# Patient Record
Sex: MALE | Race: BLACK OR AFRICAN AMERICAN | Employment: FULL TIME | URBAN - METROPOLITAN AREA
[De-identification: names, ages, dates, MRNs, and addresses within clinical notes are randomized per-mention and may not be internally consistent; named-entity substitution may affect disease eponyms.]

---

## 2021-09-01 ENCOUNTER — APPOINTMENT (OUTPATIENT)
Dept: GENERAL RADIOLOGY | Age: 41
DRG: 751 | End: 2021-09-01
Payer: COMMERCIAL

## 2021-09-01 ENCOUNTER — APPOINTMENT (OUTPATIENT)
Dept: CT IMAGING | Age: 41
DRG: 751 | End: 2021-09-01
Payer: COMMERCIAL

## 2021-09-01 ENCOUNTER — HOSPITAL ENCOUNTER (EMERGENCY)
Age: 41
Discharge: HOME | DRG: 751 | End: 2021-09-01
Attending: EMERGENCY MEDICINE
Payer: COMMERCIAL

## 2021-09-01 VITALS
WEIGHT: 200 LBS | HEART RATE: 97 BPM | TEMPERATURE: 96.8 F | HEIGHT: 67 IN | BODY MASS INDEX: 31.39 KG/M2 | OXYGEN SATURATION: 98 % | SYSTOLIC BLOOD PRESSURE: 148 MMHG | DIASTOLIC BLOOD PRESSURE: 107 MMHG | RESPIRATION RATE: 20 BRPM

## 2021-09-01 DIAGNOSIS — R06.02 SHORTNESS OF BREATH: Primary | ICD-10-CM

## 2021-09-01 DIAGNOSIS — F15.10 METHAMPHETAMINE USE (HCC): ICD-10-CM

## 2021-09-01 LAB
ACETAMINOPHEN LEVEL: <5 MCG/ML (ref 10–30)
ALBUMIN SERPL-MCNC: 4.8 G/DL (ref 3.5–5.2)
ALP BLD-CCNC: 66 U/L (ref 40–129)
ALT SERPL-CCNC: 75 U/L (ref 0–40)
AMPHETAMINE SCREEN, URINE: POSITIVE
ANION GAP SERPL CALCULATED.3IONS-SCNC: 17 MMOL/L (ref 7–16)
AST SERPL-CCNC: 211 U/L (ref 0–39)
BACTERIA: NORMAL /HPF
BARBITURATE SCREEN URINE: NOT DETECTED
BASOPHILS ABSOLUTE: 0.02 E9/L (ref 0–0.2)
BASOPHILS RELATIVE PERCENT: 0.1 % (ref 0–2)
BENZODIAZEPINE SCREEN, URINE: NOT DETECTED
BILIRUB SERPL-MCNC: 1.1 MG/DL (ref 0–1.2)
BILIRUBIN URINE: ABNORMAL
BILIRUBIN URINE: ABNORMAL
BLOOD, URINE: ABNORMAL
BLOOD, URINE: ABNORMAL
BUN BLDV-MCNC: 18 MG/DL (ref 6–20)
CALCIUM SERPL-MCNC: 9.8 MG/DL (ref 8.6–10.2)
CANNABINOID SCREEN URINE: NOT DETECTED
CHLORIDE BLD-SCNC: 96 MMOL/L (ref 98–107)
CLARITY: CLEAR
CLARITY: CLEAR
CO2: 20 MMOL/L (ref 22–29)
COCAINE METABOLITE SCREEN URINE: NOT DETECTED
COLOR: YELLOW
COLOR: YELLOW
CREAT SERPL-MCNC: 1.3 MG/DL (ref 0.7–1.2)
D DIMER: 254 NG/ML DDU
EKG ATRIAL RATE: 127 BPM
EKG P AXIS: 71 DEGREES
EKG P-R INTERVAL: 144 MS
EKG Q-T INTERVAL: 300 MS
EKG QRS DURATION: 72 MS
EKG QTC CALCULATION (BAZETT): 436 MS
EKG R AXIS: 82 DEGREES
EKG T AXIS: 60 DEGREES
EKG VENTRICULAR RATE: 127 BPM
EOSINOPHILS ABSOLUTE: 0 E9/L (ref 0.05–0.5)
EOSINOPHILS RELATIVE PERCENT: 0 % (ref 0–6)
ETHANOL: <10 MG/DL (ref 0–0.08)
FENTANYL SCREEN, URINE: POSITIVE
GFR AFRICAN AMERICAN: >60
GFR NON-AFRICAN AMERICAN: >60 ML/MIN/1.73
GLUCOSE BLD-MCNC: 105 MG/DL (ref 74–99)
GLUCOSE URINE: NEGATIVE MG/DL
GLUCOSE URINE: NEGATIVE MG/DL
HCT VFR BLD CALC: 44.6 % (ref 37–54)
HEMOGLOBIN: 15.4 G/DL (ref 12.5–16.5)
IMMATURE GRANULOCYTES #: 0.08 E9/L
IMMATURE GRANULOCYTES %: 0.5 % (ref 0–5)
KETONES, URINE: >=160 MG/DL
KETONES, URINE: >=160 MG/DL
LACTIC ACID: 1.5 MMOL/L (ref 0.5–2.2)
LEUKOCYTE ESTERASE, URINE: NEGATIVE
LEUKOCYTE ESTERASE, URINE: NEGATIVE
LYMPHOCYTES ABSOLUTE: 1.56 E9/L (ref 1.5–4)
LYMPHOCYTES RELATIVE PERCENT: 10.1 % (ref 20–42)
Lab: ABNORMAL
MCH RBC QN AUTO: 30.4 PG (ref 26–35)
MCHC RBC AUTO-ENTMCNC: 34.5 % (ref 32–34.5)
MCV RBC AUTO: 88 FL (ref 80–99.9)
METHADONE SCREEN, URINE: NOT DETECTED
MONOCYTES ABSOLUTE: 1.19 E9/L (ref 0.1–0.95)
MONOCYTES RELATIVE PERCENT: 7.7 % (ref 2–12)
NEUTROPHILS ABSOLUTE: 12.63 E9/L (ref 1.8–7.3)
NEUTROPHILS RELATIVE PERCENT: 81.6 % (ref 43–80)
NITRITE, URINE: NEGATIVE
NITRITE, URINE: NEGATIVE
OPIATE SCREEN URINE: NOT DETECTED
OXYCODONE URINE: NOT DETECTED
PDW BLD-RTO: 14.7 FL (ref 11.5–15)
PH UA: 5 (ref 5–9)
PH UA: 5 (ref 5–9)
PHENCYCLIDINE SCREEN URINE: NOT DETECTED
PLATELET # BLD: 391 E9/L (ref 130–450)
PMV BLD AUTO: 8.8 FL (ref 7–12)
POTASSIUM REFLEX MAGNESIUM: 4.2 MMOL/L (ref 3.5–5)
PROTEIN UA: 100 MG/DL
PROTEIN UA: NEGATIVE MG/DL
RBC # BLD: 5.07 E12/L (ref 3.8–5.8)
RBC UA: NORMAL /HPF (ref 0–2)
REASON FOR REJECTION: NORMAL
REJECTED TEST: NORMAL
SALICYLATE, SERUM: <0.3 MG/DL (ref 0–30)
SODIUM BLD-SCNC: 133 MMOL/L (ref 132–146)
SPECIFIC GRAVITY UA: >=1.03 (ref 1–1.03)
SPECIFIC GRAVITY UA: >=1.03 (ref 1–1.03)
TOTAL PROTEIN: 8.7 G/DL (ref 6.4–8.3)
TRICYCLIC ANTIDEPRESSANTS SCREEN SERUM: NEGATIVE NG/ML
TROPONIN, HIGH SENSITIVITY: 18 NG/L (ref 0–11)
TROPONIN, HIGH SENSITIVITY: 19 NG/L (ref 0–11)
UROBILINOGEN, URINE: 0.2 E.U./DL
UROBILINOGEN, URINE: 0.2 E.U./DL
WBC # BLD: 15.5 E9/L (ref 4.5–11.5)
WBC UA: NORMAL /HPF (ref 0–5)

## 2021-09-01 PROCEDURE — 36415 COLL VENOUS BLD VENIPUNCTURE: CPT

## 2021-09-01 PROCEDURE — 71275 CT ANGIOGRAPHY CHEST: CPT

## 2021-09-01 PROCEDURE — 80307 DRUG TEST PRSMV CHEM ANLYZR: CPT

## 2021-09-01 PROCEDURE — 82077 ASSAY SPEC XCP UR&BREATH IA: CPT

## 2021-09-01 PROCEDURE — 80179 DRUG ASSAY SALICYLATE: CPT

## 2021-09-01 PROCEDURE — 80053 COMPREHEN METABOLIC PANEL: CPT

## 2021-09-01 PROCEDURE — 80143 DRUG ASSAY ACETAMINOPHEN: CPT

## 2021-09-01 PROCEDURE — 85025 COMPLETE CBC W/AUTO DIFF WBC: CPT

## 2021-09-01 PROCEDURE — 6370000000 HC RX 637 (ALT 250 FOR IP): Performed by: EMERGENCY MEDICINE

## 2021-09-01 PROCEDURE — 84484 ASSAY OF TROPONIN QUANT: CPT

## 2021-09-01 PROCEDURE — 2580000003 HC RX 258: Performed by: EMERGENCY MEDICINE

## 2021-09-01 PROCEDURE — 6360000004 HC RX CONTRAST MEDICATION: Performed by: RADIOLOGY

## 2021-09-01 PROCEDURE — 93005 ELECTROCARDIOGRAM TRACING: CPT | Performed by: EMERGENCY MEDICINE

## 2021-09-01 PROCEDURE — 99285 EMERGENCY DEPT VISIT HI MDM: CPT

## 2021-09-01 PROCEDURE — 85378 FIBRIN DEGRADE SEMIQUANT: CPT

## 2021-09-01 PROCEDURE — 71045 X-RAY EXAM CHEST 1 VIEW: CPT

## 2021-09-01 PROCEDURE — 83605 ASSAY OF LACTIC ACID: CPT

## 2021-09-01 PROCEDURE — 81001 URINALYSIS AUTO W/SCOPE: CPT

## 2021-09-01 RX ORDER — 0.9 % SODIUM CHLORIDE 0.9 %
1000 INTRAVENOUS SOLUTION INTRAVENOUS ONCE
Status: COMPLETED | OUTPATIENT
Start: 2021-09-01 | End: 2021-09-01

## 2021-09-01 RX ORDER — BICTEGRAVIR SODIUM, EMTRICITABINE, AND TENOFOVIR ALAFENAMIDE FUMARATE 50; 200; 25 MG/1; MG/1; MG/1
1 TABLET ORAL NIGHTLY
COMMUNITY
End: 2021-09-17 | Stop reason: SDUPTHER

## 2021-09-01 RX ORDER — IPRATROPIUM BROMIDE AND ALBUTEROL SULFATE 2.5; .5 MG/3ML; MG/3ML
1 SOLUTION RESPIRATORY (INHALATION)
Status: COMPLETED | OUTPATIENT
Start: 2021-09-01 | End: 2021-09-01

## 2021-09-01 RX ORDER — CLOBETASOL PROPIONATE 0.5 MG/G
CREAM TOPICAL 2 TIMES DAILY PRN
COMMUNITY
End: 2021-12-22 | Stop reason: SDUPTHER

## 2021-09-01 RX ORDER — ALBUTEROL SULFATE 90 UG/1
2 AEROSOL, METERED RESPIRATORY (INHALATION) EVERY 6 HOURS PRN
COMMUNITY
End: 2021-12-22 | Stop reason: SDUPTHER

## 2021-09-01 RX ORDER — ALBUTEROL SULFATE 2.5 MG/3ML
2.5 SOLUTION RESPIRATORY (INHALATION)
COMMUNITY
End: 2021-12-22 | Stop reason: SDUPTHER

## 2021-09-01 RX ORDER — OMEPRAZOLE 20 MG/1
20 CAPSULE, DELAYED RELEASE ORAL NIGHTLY
COMMUNITY
End: 2021-12-22

## 2021-09-01 RX ADMIN — IPRATROPIUM BROMIDE AND ALBUTEROL SULFATE 1 AMPULE: .5; 3 SOLUTION RESPIRATORY (INHALATION) at 10:30

## 2021-09-01 RX ADMIN — IPRATROPIUM BROMIDE AND ALBUTEROL SULFATE 1 AMPULE: .5; 3 SOLUTION RESPIRATORY (INHALATION) at 10:40

## 2021-09-01 RX ADMIN — SODIUM CHLORIDE 1000 ML: 9 INJECTION, SOLUTION INTRAVENOUS at 09:55

## 2021-09-01 RX ADMIN — SODIUM CHLORIDE 1000 ML: 9 INJECTION, SOLUTION INTRAVENOUS at 12:07

## 2021-09-01 RX ADMIN — IPRATROPIUM BROMIDE AND ALBUTEROL SULFATE 1 AMPULE: .5; 3 SOLUTION RESPIRATORY (INHALATION) at 10:35

## 2021-09-01 RX ADMIN — IOPAMIDOL 60 ML: 755 INJECTION, SOLUTION INTRAVENOUS at 13:34

## 2021-09-01 ASSESSMENT — PAIN SCALES - GENERAL: PAINLEVEL_OUTOF10: 10

## 2021-09-01 ASSESSMENT — PAIN DESCRIPTION - ORIENTATION: ORIENTATION: MID

## 2021-09-01 ASSESSMENT — ENCOUNTER SYMPTOMS
SORE THROAT: 0
EYE PAIN: 0
VOMITING: 0
DIARRHEA: 0
SHORTNESS OF BREATH: 1
BACK PAIN: 0
COUGH: 0
ABDOMINAL PAIN: 0
NAUSEA: 0

## 2021-09-01 ASSESSMENT — PAIN DESCRIPTION - FREQUENCY: FREQUENCY: CONTINUOUS

## 2021-09-01 ASSESSMENT — PAIN DESCRIPTION - PAIN TYPE: TYPE: ACUTE PAIN

## 2021-09-01 ASSESSMENT — PAIN DESCRIPTION - DESCRIPTORS: DESCRIPTORS: STABBING

## 2021-09-01 ASSESSMENT — PAIN DESCRIPTION - LOCATION: LOCATION: CHEST

## 2021-09-01 NOTE — ED PROVIDER NOTES
HPI   Patient is a 36 y.o. male with a past medical history of meth abuse, presenting to the Emergency Department for shortness of breath, chest pain, paranoia. History obtained by patient. Symptoms are moderate in severity and constant since onset. They are improved by nothing and worsened by running. Patient presents mostly for shortness of breath. States it started approximate 7 AM today. He states he was running and felt short of breath. History of asthma. Took an inhaler at home without relief so he presented to the ER. States he also has a tightness in his chest when he started to have his asthma exacerbation. Denies any focal pain. She is a tightness in the left and right side. Does not radiate anywhere. Denies diaphoresis, nausea, vomiting, diarrhea. He does admit to relapsing on methamphetamines. Last use was this morning around 7 AM.  He states he is very paranoid and still tripping from it. Denies any history of blood clots. Denies leg pain, leg swelling, no recent car trips, long travel. He denies suicidal or homicidal ideations. Does admit to visual hallucinations and paranoia. Review of Systems   Constitutional: Negative for chills and fever. HENT: Negative for congestion and sore throat. Eyes: Negative for pain and visual disturbance. Respiratory: Positive for shortness of breath. Negative for cough. Cardiovascular: Negative for chest pain. Gastrointestinal: Negative for abdominal pain, diarrhea, nausea and vomiting. Genitourinary: Negative for dysuria and frequency. Musculoskeletal: Negative for arthralgias and back pain. Skin: Negative for rash and wound. Neurological: Negative for weakness and headaches. Hematological: Negative for adenopathy. Psychiatric/Behavioral: Positive for agitation and hallucinations. Negative for self-injury and suicidal ideas. The patient is not nervous/anxious. All other systems reviewed and are negative.        Physical Exam  Vitals and nursing note reviewed. Constitutional:       General: He is not in acute distress. Appearance: He is well-developed. He is not ill-appearing. HENT:      Head: Normocephalic and atraumatic. Eyes:      Extraocular Movements: Extraocular movements intact. Pupils: Pupils are equal, round, and reactive to light. Cardiovascular:      Rate and Rhythm: Regular rhythm. Tachycardia present. Pulses: Normal pulses. Heart sounds: Normal heart sounds. No murmur heard. Pulmonary:      Effort: Pulmonary effort is normal. No respiratory distress. Breath sounds: Wheezing (Mild expiratory wheezes in the bases) present. No rales. Abdominal:      Palpations: Abdomen is soft. Tenderness: There is no abdominal tenderness. There is no guarding or rebound. Musculoskeletal:      Cervical back: Normal range of motion and neck supple. Right lower leg: No tenderness. No edema. Left lower leg: No tenderness. No edema. Skin:     General: Skin is warm and dry. Capillary Refill: Capillary refill takes less than 2 seconds. Neurological:      General: No focal deficit present. Mental Status: He is alert and oriented to person, place, and time. Cranial Nerves: No cranial nerve deficit. Coordination: Coordination normal.   Psychiatric:         Mood and Affect: Mood is anxious. Comments: Anxious, paranoid          Procedures     MDM   Patient presented to the Emergency Department for shortness of breatj and chaest pain. They are clinically stable, tachycardic, non toxic appearing. Work up [employer]. Mild wheeze and nebs given, symptoms improving.ekg reassuring, troponin 19 and 19, less cocnern for acs. Mild zeeshan and transamnitits but no baseline. Fluids given. Liver enzyes, AST 2x ALT concerning possibly etoh induced. Mild leukocytosis, no ovr signs of infection. PE considered but wells low risk, dimer mildly elevated and cTA ordered.  CT with no PE and concerning for bronchitis. Also live lesion. With reassuring work up, feeling improved, and physical exam, patient okay for close outpatient eval and care. inititally paranoid but insight to it, no suicidal or homicidal thoughts. Most likely 2/2 to meth use. throughout time in ED symptoms improved and paranoia resolved. baseline on final eval. Educated about symptoms, diagnosis and supportive care. isntructed on the needs to follow up and discussed labs and imagining results here in ed. He verbalized understanding of need to follow up and disucss findings as well as the need to stop doing meth. Strict return precautions were discussed including but not limited too worsening dyspnea, chest pain, thoughts out hurting himself or others, new or worsening symtpoms. They verbalized understanding and were agreeable with the plan. All questions were answered and patient was discharged. ED Course as of Sep 01 2205   Wed Sep 01, 2021   1132 Patient was reevaluated feeling somewhat improved after DuoNeb's. He does not really patient in the room. Remains paranoid. Denies suicidal or homicidal ideations. []   [id number] Want to reevaluate patient, not on room, does have a shirt in the room and there is a hospital phone. Will look for patient    []   [id number] Reevaluated patient, resting comfortably in his room. Feels significantly better. Updated about lab work and imaging findings at this time. He denies auditory    []      ED Course User Index  [] Aditi Haines-DO Jenaro          --------------------------------------------- PAST HISTORY ---------------------------------------------  Past Medical History:  has no past medical history on file. Past Surgical History:  has no past surgical history on file. Social History:  reports that he has been smoking. He has been smoking about 1.00 pack per day. He has never used smokeless tobacco. He reports previous alcohol use. He reports current drug use.  Drug: Methamphetamines. Family History: family history is not on file. The patients home medications have been reviewed. Allergies: Patient has no known allergies.     -------------------------------------------------- RESULTS -------------------------------------------------  Labs:  Results for orders placed or performed during the hospital encounter of 09/01/21   CBC Auto Differential   Result Value Ref Range    WBC 15.5 (H) 4.5 - 11.5 E9/L    RBC 5.07 3.80 - 5.80 E12/L    Hemoglobin 15.4 12.5 - 16.5 g/dL    Hematocrit 44.6 37.0 - 54.0 %    MCV 88.0 80.0 - 99.9 fL    MCH 30.4 26.0 - 35.0 pg    MCHC 34.5 32.0 - 34.5 %    RDW 14.7 11.5 - 15.0 fL    Platelets 151 421 - 805 E9/L    MPV 8.8 7.0 - 12.0 fL    Neutrophils % 81.6 (H) 43.0 - 80.0 %    Immature Granulocytes % 0.5 0.0 - 5.0 %    Lymphocytes % 10.1 (L) 20.0 - 42.0 %    Monocytes % 7.7 2.0 - 12.0 %    Eosinophils % 0.0 0.0 - 6.0 %    Basophils % 0.1 0.0 - 2.0 %    Neutrophils Absolute 12.63 (H) 1.80 - 7.30 E9/L    Immature Granulocytes # 0.08 E9/L    Lymphocytes Absolute 1.56 1.50 - 4.00 E9/L    Monocytes Absolute 1.19 (H) 0.10 - 0.95 E9/L    Eosinophils Absolute 0.00 (L) 0.05 - 0.50 E9/L    Basophils Absolute 0.02 0.00 - 0.20 E9/L   Comprehensive Metabolic Panel w/ Reflex to MG   Result Value Ref Range    Sodium 133 132 - 146 mmol/L    Potassium reflex Magnesium 4.2 3.5 - 5.0 mmol/L    Chloride 96 (L) 98 - 107 mmol/L    CO2 20 (L) 22 - 29 mmol/L    Anion Gap 17 (H) 7 - 16 mmol/L    Glucose 105 (H) 74 - 99 mg/dL    BUN 18 6 - 20 mg/dL    CREATININE 1.3 (H) 0.7 - 1.2 mg/dL    GFR Non-African American >60 >=60 mL/min/1.73    GFR African American >60     Calcium 9.8 8.6 - 10.2 mg/dL    Total Protein 8.7 (H) 6.4 - 8.3 g/dL    Albumin 4.8 3.5 - 5.2 g/dL    Total Bilirubin 1.1 0.0 - 1.2 mg/dL    Alkaline Phosphatase 66 40 - 129 U/L    ALT 75 (H) 0 - 40 U/L     (H) 0 - 39 U/L   Troponin   Result Value Ref Range    Troponin, High Sensitivity 19 (H) 0 - 11 ng/L   Lactic Acid, Plasma   Result Value Ref Range    Lactic Acid 1.5 0.5 - 2.2 mmol/L   Urinalysis, reflex to microscopic   Result Value Ref Range    Color, UA Yellow Straw/Yellow    Clarity, UA Clear Clear    Glucose, Ur Negative Negative mg/dL    Bilirubin Urine SMALL (A) Negative    Ketones, Urine >=160 Negative mg/dL    Specific Gravity, UA >=1.030 1.005 - 1.030    Blood, Urine MODERATE (A) Negative    pH, UA 5.0 5.0 - 9.0    Protein,  (A) Negative mg/dL    Urobilinogen, Urine 0.2 <2.0 E.U./dL    Nitrite, Urine Negative Negative    Leukocyte Esterase, Urine Negative Negative   D-Dimer, Quantitative   Result Value Ref Range    D-Dimer, Quant 254 ng/mL DDU   Serum Drug Screen   Result Value Ref Range    Ethanol Lvl <10 mg/dL    Acetaminophen Level <5.0 (L) 10.0 - 53.6 mcg/mL    Salicylate, Serum <9.8 0.0 - 30.0 mg/dL    TCA Scrn NEGATIVE Cutoff:300 ng/mL   Troponin   Result Value Ref Range    Troponin, High Sensitivity 18 (H) 0 - 11 ng/L   SPECIMEN REJECTION   Result Value Ref Range    Rejected Test UA UDS     Reason for Rejection see below    Urinalysis, reflex to microscopic   Result Value Ref Range    Color, UA Yellow Straw/Yellow    Clarity, UA Clear Clear    Glucose, Ur Negative Negative mg/dL    Bilirubin Urine SMALL (A) Negative    Ketones, Urine >=160 Negative mg/dL    Specific Gravity, UA >=1.030 1.005 - 1.030    Blood, Urine TRACE (A) Negative    pH, UA 5.0 5.0 - 9.0    Protein, UA Negative Negative mg/dL    Urobilinogen, Urine 0.2 <2.0 E.U./dL    Nitrite, Urine Negative Negative    Leukocyte Esterase, Urine Negative Negative   URINE DRUG SCREEN   Result Value Ref Range    Amphetamine Screen, Urine POSITIVE (A) Negative <1000 ng/mL    Barbiturate Screen, Ur NOT DETECTED Negative < 200 ng/mL    Benzodiazepine Screen, Urine NOT DETECTED Negative < 200 ng/mL    Cannabinoid Scrn, Ur NOT DETECTED Negative < 50ng/mL    Cocaine Metabolite Screen, Urine NOT DETECTED Negative < 300 ng/mL    Opiate Scrn, Ur NOT DETECTED Negative < 300ng/mL    PCP Screen, Urine NOT DETECTED Negative < 25 ng/mL    Methadone Screen, Urine NOT DETECTED Negative <300 ng/mL    Oxycodone Urine NOT DETECTED Negative <100 ng/mL    FENTANYL SCREEN, URINE POSITIVE (A) Negative <1 ng/mL    Drug Screen Comment: see below    Microscopic Urinalysis   Result Value Ref Range    WBC, UA 0-1 0 - 5 /HPF    RBC, UA 0-1 0 - 2 /HPF    Bacteria, UA NONE SEEN None Seen /HPF   EKG 12 Lead   Result Value Ref Range    Ventricular Rate 127 BPM    Atrial Rate 127 BPM    P-R Interval 144 ms    QRS Duration 72 ms    Q-T Interval 300 ms    QTc Calculation (Bazett) 436 ms    P Axis 71 degrees    R Axis 82 degrees    T Axis 60 degrees       Radiology:  CTA PULMONARY W CONTRAST   Final Result   1. No pulmonary embolism. 2. Mild thickening of the bronchial walls which may signify bronchitis,   either acute or chronic. No pulmonary consolidation. 3. 1.6 cm enhancing nodule in the right lobe of the liver, segment VII. The   absence of known malignancy or other risk factors, hemangioma or focal   nodular hyperplasia are the most likely etiologies. If indicated,   multiphasic contrast enhanced MRI may be obtained for further evaluation. XR CHEST PORTABLE   Final Result   No acute process. EKG:  This EKG is signed and interpreted by ED Physician. Time:  0927   Rate: 127  Rhythm: Sinus. Interpretation: sinus tachycardia. Normal axis deviation. No STEMI. QTc 436. Comparison: no previous EKG.      ------------------------- NURSING NOTES AND VITALS REVIEWED ---------------------------  Date / Time Roomed:  9/1/2021  9:13 AM  ED Bed Assignment:  15/15    The nursing notes within the ED encounter and vital signs as below have been reviewed.    BP (!) 148/107   Pulse 97   Temp 96.8 °F (36 °C) (Tympanic)   Resp 20   Ht 5' 7\" (1.702 m)   Wt 200 lb (90.7 kg)   SpO2 98%   BMI 31.32 kg/m²   Oxygen Saturation Interpretation: Normal      ------------------------------------------ PROGRESS NOTES ------------------------------------------  ED COURSE MEDICATIONS:                Medications   0.9 % sodium chloride bolus (0 mLs IntraVENous Stopped 9/1/21 1055)   ipratropium-albuterol (DUONEB) nebulizer solution 1 ampule (1 ampule Inhalation Given 9/1/21 1040)   0.9 % sodium chloride bolus (0 mLs IntraVENous Stopped 9/1/21 1307)   iopamidol (ISOVUE-370) 76 % injection 60 mL (60 mLs IntraVENous Given 9/1/21 1334)       I have spoken with the patient and discussed todays results, in addition to providing specific details for the plan of care and counseling regarding the diagnosis and prognosis. Their questions are answered at this time and they are agreeable with the plan. I discussed at length with them reasons for immediate return here for re evaluation. They will followup with primary care by calling their office tomorrow. --------------------------------- ADDITIONAL PROVIDER NOTES ---------------------------------  At this time the patient is without objective evidence of an acute process requiring hospitalization or inpatient management. They have remained hemodynamically stable throughout their entire ED visit and are stable for discharge with outpatient follow-up. The plan has been discussed in detail and they are aware of the specific conditions for emergent return, as well as the importance of follow-up. Discharge Medication List as of 9/1/2021  2:22 PM          Diagnosis:  1. Shortness of breath    2. Methamphetamine use ([facility] [address]        Disposition:  Patient's disposition: Discharge to home  Patient's condition is stable.         Barby Gonzalez DO  Resident  09/01/21 [id number]

## 2021-09-01 NOTE — ED NOTES
States  He does not want to wait for , Dr. Marylou Wren states okay to discharge     Wood Vera RN  09/01/21 [phone]

## 2021-09-01 NOTE — ED NOTES
Bed: 15  Expected date:   Expected time:   Means of arrival:   Comments:  triage     Breanna Delaney RN  09/01/21 [id number]

## 2021-09-01 NOTE — ED NOTES
Patient nervious and paranoid, states \"you better call security\". States that \"the people that were after me are here\". Patient pulling monitor off and walking around hallway. States he feels like he is \"still tripping\". Used meth last night.       Lorraine Anderson RN  09/01/21 1021

## 2021-09-02 ENCOUNTER — APPOINTMENT (OUTPATIENT)
Dept: CT IMAGING | Age: 41
DRG: 751 | End: 2021-09-02
Payer: COMMERCIAL

## 2021-09-02 ENCOUNTER — APPOINTMENT (OUTPATIENT)
Dept: GENERAL RADIOLOGY | Age: 41
DRG: 751 | End: 2021-09-02
Payer: COMMERCIAL

## 2021-09-02 ENCOUNTER — HOSPITAL ENCOUNTER (INPATIENT)
Age: 41
LOS: 6 days | Discharge: HOME | DRG: 751 | End: 2021-09-08
Attending: STUDENT IN AN ORGANIZED HEALTH CARE EDUCATION/TRAINING PROGRAM | Admitting: PSYCHIATRY & NEUROLOGY
Payer: COMMERCIAL

## 2021-09-02 DIAGNOSIS — R44.3 HALLUCINATIONS: Primary | ICD-10-CM

## 2021-09-02 DIAGNOSIS — R45.851 SUICIDAL IDEATION: ICD-10-CM

## 2021-09-02 DIAGNOSIS — B20 HISTORY OF HIV INFECTION (HCC): ICD-10-CM

## 2021-09-02 DIAGNOSIS — F19.90 SUBSTANCE USE DISORDER: ICD-10-CM

## 2021-09-02 DIAGNOSIS — R74.01 TRANSAMINITIS: ICD-10-CM

## 2021-09-02 LAB
ACETAMINOPHEN LEVEL: <5 MCG/ML (ref 10–30)
ALBUMIN SERPL-MCNC: 4.6 G/DL (ref 3.5–5.2)
ALP BLD-CCNC: 64 U/L (ref 40–129)
ALT SERPL-CCNC: 91 U/L (ref 0–40)
AMORPHOUS: ABNORMAL
AMPHETAMINE SCREEN, URINE: POSITIVE
ANION GAP SERPL CALCULATED.3IONS-SCNC: 12 MMOL/L (ref 7–16)
AST SERPL-CCNC: 208 U/L (ref 0–39)
BACTERIA: ABNORMAL /HPF
BARBITURATE SCREEN URINE: NOT DETECTED
BASOPHILS ABSOLUTE: 0.02 E9/L (ref 0–0.2)
BASOPHILS RELATIVE PERCENT: 0.2 % (ref 0–2)
BENZODIAZEPINE SCREEN, URINE: NOT DETECTED
BILIRUB SERPL-MCNC: 0.8 MG/DL (ref 0–1.2)
BILIRUBIN URINE: ABNORMAL
BLOOD, URINE: ABNORMAL
BUN BLDV-MCNC: 13 MG/DL (ref 6–20)
CALCIUM SERPL-MCNC: 9.5 MG/DL (ref 8.6–10.2)
CANNABINOID SCREEN URINE: NOT DETECTED
CHLORIDE BLD-SCNC: 99 MMOL/L (ref 98–107)
CLARITY: CLEAR
CO2: 26 MMOL/L (ref 22–29)
COCAINE METABOLITE SCREEN URINE: NOT DETECTED
COLOR: YELLOW
CREAT SERPL-MCNC: 1.2 MG/DL (ref 0.7–1.2)
EKG ATRIAL RATE: 111 BPM
EKG P AXIS: 58 DEGREES
EKG P-R INTERVAL: 138 MS
EKG Q-T INTERVAL: 326 MS
EKG QRS DURATION: 74 MS
EKG QTC CALCULATION (BAZETT): 443 MS
EKG R AXIS: 49 DEGREES
EKG T AXIS: 52 DEGREES
EKG VENTRICULAR RATE: 111 BPM
EOSINOPHILS ABSOLUTE: 0.04 E9/L (ref 0.05–0.5)
EOSINOPHILS RELATIVE PERCENT: 0.5 % (ref 0–6)
EPITHELIAL CELLS, UA: ABNORMAL /HPF
ETHANOL: <10 MG/DL (ref 0–0.08)
FENTANYL SCREEN, URINE: NOT DETECTED
GFR AFRICAN AMERICAN: >60
GFR NON-AFRICAN AMERICAN: >60 ML/MIN/1.73
GLUCOSE BLD-MCNC: 105 MG/DL (ref 74–99)
GLUCOSE URINE: NEGATIVE MG/DL
HCT VFR BLD CALC: 43.6 % (ref 37–54)
HEMOGLOBIN: 14.6 G/DL (ref 12.5–16.5)
IMMATURE GRANULOCYTES #: 0.04 E9/L
IMMATURE GRANULOCYTES %: 0.5 % (ref 0–5)
INFLUENZA A: NOT DETECTED
INFLUENZA B: NOT DETECTED
KETONES, URINE: 40 MG/DL
LEUKOCYTE ESTERASE, URINE: NEGATIVE
LYMPHOCYTES ABSOLUTE: 1.37 E9/L (ref 1.5–4)
LYMPHOCYTES RELATIVE PERCENT: 16.9 % (ref 20–42)
Lab: ABNORMAL
MCH RBC QN AUTO: 30.2 PG (ref 26–35)
MCHC RBC AUTO-ENTMCNC: 33.5 % (ref 32–34.5)
MCV RBC AUTO: 90.1 FL (ref 80–99.9)
METHADONE SCREEN, URINE: NOT DETECTED
MONOCYTES ABSOLUTE: 0.79 E9/L (ref 0.1–0.95)
MONOCYTES RELATIVE PERCENT: 9.7 % (ref 2–12)
NEUTROPHILS ABSOLUTE: 5.86 E9/L (ref 1.8–7.3)
NEUTROPHILS RELATIVE PERCENT: 72.2 % (ref 43–80)
NITRITE, URINE: NEGATIVE
OPIATE SCREEN URINE: NOT DETECTED
OXYCODONE URINE: NOT DETECTED
PDW BLD-RTO: 14.6 FL (ref 11.5–15)
PH UA: 5 (ref 5–9)
PHENCYCLIDINE SCREEN URINE: NOT DETECTED
PLATELET # BLD: 390 E9/L (ref 130–450)
PMV BLD AUTO: 8.9 FL (ref 7–12)
POTASSIUM SERPL-SCNC: 3.9 MMOL/L (ref 3.5–5)
PROTEIN UA: ABNORMAL MG/DL
RBC # BLD: 4.84 E12/L (ref 3.8–5.8)
RBC UA: ABNORMAL /HPF (ref 0–2)
SALICYLATE, SERUM: <0.3 MG/DL (ref 0–30)
SARS-COV-2 RNA, RT PCR: NOT DETECTED
SODIUM BLD-SCNC: 137 MMOL/L (ref 132–146)
SPECIFIC GRAVITY UA: >=1.03 (ref 1–1.03)
STREP GRP A PCR: NEGATIVE
TOTAL PROTEIN: 8.4 G/DL (ref 6.4–8.3)
TRICYCLIC ANTIDEPRESSANTS SCREEN SERUM: NEGATIVE NG/ML
TROPONIN, HIGH SENSITIVITY: 6 NG/L (ref 0–11)
TROPONIN, HIGH SENSITIVITY: <6 NG/L (ref 0–11)
UROBILINOGEN, URINE: 0.2 E.U./DL
WBC # BLD: 8.1 E9/L (ref 4.5–11.5)
WBC UA: ABNORMAL /HPF (ref 0–5)

## 2021-09-02 PROCEDURE — 80307 DRUG TEST PRSMV CHEM ANLYZR: CPT

## 2021-09-02 PROCEDURE — 86359 T CELLS TOTAL COUNT: CPT

## 2021-09-02 PROCEDURE — 71045 X-RAY EXAM CHEST 1 VIEW: CPT

## 2021-09-02 PROCEDURE — 6360000004 HC RX CONTRAST MEDICATION: Performed by: RADIOLOGY

## 2021-09-02 PROCEDURE — 85025 COMPLETE CBC W/AUTO DIFF WBC: CPT

## 2021-09-02 PROCEDURE — 82077 ASSAY SPEC XCP UR&BREATH IA: CPT

## 2021-09-02 PROCEDURE — 87880 STREP A ASSAY W/OPTIC: CPT

## 2021-09-02 PROCEDURE — 70470 CT HEAD/BRAIN W/O & W/DYE: CPT

## 2021-09-02 PROCEDURE — 86360 T CELL ABSOLUTE COUNT/RATIO: CPT

## 2021-09-02 PROCEDURE — 81001 URINALYSIS AUTO W/SCOPE: CPT

## 2021-09-02 PROCEDURE — 96360 HYDRATION IV INFUSION INIT: CPT

## 2021-09-02 PROCEDURE — 99284 EMERGENCY DEPT VISIT MOD MDM: CPT

## 2021-09-02 PROCEDURE — 80053 COMPREHEN METABOLIC PANEL: CPT

## 2021-09-02 PROCEDURE — 80143 DRUG ASSAY ACETAMINOPHEN: CPT

## 2021-09-02 PROCEDURE — 1240000000 HC EMOTIONAL WELLNESS R&B

## 2021-09-02 PROCEDURE — 2580000003 HC RX 258: Performed by: STUDENT IN AN ORGANIZED HEALTH CARE EDUCATION/TRAINING PROGRAM

## 2021-09-02 PROCEDURE — 93005 ELECTROCARDIOGRAM TRACING: CPT | Performed by: STUDENT IN AN ORGANIZED HEALTH CARE EDUCATION/TRAINING PROGRAM

## 2021-09-02 PROCEDURE — 84484 ASSAY OF TROPONIN QUANT: CPT

## 2021-09-02 PROCEDURE — 87636 SARSCOV2 & INF A&B AMP PRB: CPT

## 2021-09-02 PROCEDURE — 80179 DRUG ASSAY SALICYLATE: CPT

## 2021-09-02 RX ORDER — 0.9 % SODIUM CHLORIDE 0.9 %
1000 INTRAVENOUS SOLUTION INTRAVENOUS ONCE
Status: COMPLETED | OUTPATIENT
Start: 2021-09-02 | End: 2021-09-02

## 2021-09-02 RX ORDER — ACETAMINOPHEN 325 MG/1
650 TABLET ORAL ONCE
Status: DISCONTINUED | OUTPATIENT
Start: 2021-09-02 | End: 2021-09-08 | Stop reason: HOSPADM

## 2021-09-02 RX ADMIN — SODIUM CHLORIDE 1000 ML: 9 INJECTION, SOLUTION INTRAVENOUS at 19:36

## 2021-09-02 RX ADMIN — IOPAMIDOL 90 ML: 755 INJECTION, SOLUTION INTRAVENOUS at 19:04

## 2021-09-02 ASSESSMENT — PAIN DESCRIPTION - PAIN TYPE: TYPE: ACUTE PAIN

## 2021-09-02 ASSESSMENT — PAIN DESCRIPTION - DESCRIPTORS: DESCRIPTORS: HEADACHE

## 2021-09-02 ASSESSMENT — PAIN SCALES - GENERAL: PAINLEVEL_OUTOF10: 10

## 2021-09-02 ASSESSMENT — PAIN DESCRIPTION - FREQUENCY: FREQUENCY: CONTINUOUS

## 2021-09-02 ASSESSMENT — PAIN DESCRIPTION - LOCATION: LOCATION: HEAD

## 2021-09-02 ASSESSMENT — PAIN DESCRIPTION - PROGRESSION: CLINICAL_PROGRESSION: NOT CHANGED

## 2021-09-02 ASSESSMENT — PAIN DESCRIPTION - ONSET: ONSET: ON-GOING

## 2021-09-02 NOTE — ED PROVIDER NOTES
Department of Emergency Medicine   ED  Provider Note  Admit Date/RoomTime: 9/2/2021 10:44 AM  ED Room: [address] [place] [address]          History of Present Illness:  9/2/21, Time: 11:23 AM EDT  Chief Complaint   Patient presents with    Psychiatric Evaluation     patient states \"I'm losing my mind\". Patient was brought in by ems but ems crew did not give this RN any report, patient denies SI or HI at triage       Layman Jazz is a 36 y.o. male presenting to the ED for hallucinations. The patient states that he thinks people want to kill him. This has been happening for the past 3 to 4 days. He does admit that he uses methamphetamine and fentanyl. Last used today. He has had symptoms like this in the past while using. The patient also endorses suicidal ideation. He has not tried in the past.  He states that he has a plan but does not want to talk about it. Denies homicidal ideation. The patient states he has been seen by psych in the past.  The patient is also experiencing a headache. He states it is frontal and throbbing. Started after he used fentanyl and meth. Nothing worsens or improves it. Denies any trauma. No numbness, tingling or weakness. Denies any fevers. The patient also states since he used he is feeling somewhat short of breath. He has a history of asthma but this feels slightly different. Nothing worsens or improves it. No chest pain. He has a cough that is nonproductive. Review of EMR shows the patient is on [address] When questioned about this he states he does not want to talk about it. He states his levels are undetectable.     Review of Systems:   Constitutional symptoms: Denies fever  Eyes: Denies eye pain  Ears, Nose, Mouth, Throat: Denies ear pain  Cardiovascular: Denies chest pain  Respiratory: Positive for shortness of breath  Gastrointestinal: Denies blood per rectum  Genitourinary: Denies hematuria  Skin: Denies rashes  Neurological: Positive for headache  Musculoskeletal: Denies extremity pain    --------------------------------------------- PAST HISTORY ---------------------------------------------  Past Medical History:  has a past medical history of Asthma and HIV (human immunodeficiency virus infection) ([facility] [address] Past Surgical History:  has a past surgical history that includes other surgical history (2007). Social History:  reports that he has been smoking cigarettes. He has been smoking about 1.00 pack per day. He has never used smokeless tobacco. He reports previous alcohol use. He reports current drug use. Drugs: Methamphetamines and Other-see comments. Family History: family history is not on file. . Unless otherwise noted, family history is non contributory    The patients home medications have been reviewed. Allergies: Patient has no known allergies. I have reviewed the past medical history, past surgical history, social history, and family history    ---------------------------------------------------PHYSICAL EXAM--------------------------------------    General: The patient is conversational and lying comfortably in bed. Head: Normocephalic and atraumatic. Eyes: Sclera non-icteric and no conjunctival injection  ENT: Nasal and oral membranes dry. The patient has mild exudate posteriorly without peritonsillar swelling  Neck: Trachea midline. No JVD  Respiratory: Lungs clear to auscultation bilaterally. Patient speaking in full sentences. Cardiovascular: Regular rate. No pedal edema. Gastrointestinal:  Abdomen is soft and nondistended. Bowel sounds present. There is no tenderness. There is no guarding or rebound. Musculoskeletal: No deformity  Skin: Skin warm and dry. No rashes. Neurologic: No gross motor deficits. No aphasia. Pupils are equal and reactive to light. Extraocular eye movements intact. Sensation intact bilaterally. Symmetric facies. Tongue protrudes midline. 5 out of 5 symmetric strength of the upper and lower extremities.   Negative Result Value Ref Range    Troponin, High Sensitivity <6 0 - 11 ng/L   EKG 12 Lead   Result Value Ref Range    Ventricular Rate 111 BPM    Atrial Rate 111 BPM    P-R Interval 138 ms    QRS Duration 74 ms    Q-T Interval 326 ms    QTc Calculation (Bazett) 443 ms    P Axis 58 degrees    R Axis 49 degrees    T Axis 52 degrees   ,     RADIOLOGY:  Interpreted by Radiologist unless otherwise specified  CT HEAD W WO CONTRAST   Final Result   Unremarkable pre and post contrast enhanced CT of the head. XR CHEST PORTABLE   Final Result   1. Slightly limited exam, grossly negative for acute process, with no   significant change. Heddy  ------------------------- NURSING NOTES AND VITALS REVIEWED ---------------------------   The nursing notes within the ED encounter and vital signs as below have been reviewed by myself  BP (!) 127/91   Pulse 91   Temp 97.3 °F (36.3 °C)   Resp 12   Ht 5' 7\" (1.702 m)   Wt 200 lb (90.7 kg)   SpO2 98%   BMI 31.32 kg/m²     Oxygen Saturation Interpretation: Normal    The patients available past medical records and past encounters were reviewed. ------------------------------ ED COURSE/MEDICAL DECISION MAKING----------------------  Medications   acetaminophen (TYLENOL) tablet 650 mg (has no administration in time range)   nystatin (MYCOSTATIN) 480074 UNIT/ML suspension 500,000 Units (has no administration in time range)   0.9 % sodium chloride bolus (0 mLs IntraVENous Stopped 9/2/21 2035)   iopamidol (ISOVUE-370) 76 % injection 90 mL (90 mLs IntraVENous Given 9/2/21 1904)       Medical Decision Making: This is a 36 y.o. male presenting to the ED for hallucinations. On initial presentation, the patient's vital signs are interpreted as hypertensive, tachycardic, afebrile and saturating well. Based on history and physical exam, we have a broad differential.  As the patient has admitted to substance use I feel this is likely contributing to his symptoms.   However, review of EMR shows he is on Biktar[employer]. He endorses undetectable levels however I do not see this in the system. We cannot exclude intracranial process. His neurological exam is nonfocal.  No history of trauma. Patient also endorses shortness of breath. We considered ACS, arrhythmia, and pneumonia. Will obtain chest x-ray, EKG and laboratory studies. Will obtain CT of the head with and without contrast.  The patient will be kept on suicide precautions. The patient will be hydrated a liter bolus and given Tylenol for his headache. The patient does have exudate noted. We will test him for strep. We also considered Candida with his history of HIV. We donna obtain lymphocyte subsets but these will take time to come back. They will need to be followed by primary care physician. A 12-lead EKG was performed and interpreted by myself. The rate is 111 with sinus tachycardia and normal axis. There is no ectopy. The [place] interval is 138, QRS interval is 74, and QTC interval is 443. No acute ST elevations or depressions. There is a Q-wave in lead III. This is sinus tachycardia similar to prior EKG. Laboratory studies show electrolytes within normal limits. Protein mildly elevated. Troponin is 6. It will be repeated. ALT and AST are elevated  and ALT of 91. Yesterday labs were similarly elevated. Alcohol and tricyclics negative. Amphetamines positive. Tylenol and salicylates negative. No leukocytosis or anemia. Covid and influenza negative. Urinalysis shows no evidence of infection. There is evidence of dehydration. The patient is currently being hydrated. No glucosuria but there is ketonuria. Strep negative. Patient will be given swish and swallow nystatin. Repeat troponin is negative at less than 3. Chest x-ray is no acute process no significant change but slightly limited exam.  This is interpreted by radiology.     Nursing informed me that the patient had an episode where he was talking to his mother was not present. CT head is unremarkable pre and postcontrast.  This is interpreted by radiology. Repeat vitals show improved tachycardia and the patient is saturating well. He is resting comfortably. At this time his T cells are still pending but this is a send out lab. It will take time and primary care physician should continue to follow this. I do feel he is medically cleared for psychiatry evaluation. This patient's ED course included: a personal history and physicial examination and multiple bedside re-evaluations    This patient has improved during their ED course. Consultations:  Social work    The cardiac monitor revealed sinus tachycardia with a heart rate in the 90-110s as interpreted by me. The cardiac monitor was ordered secondary to the patient's altered mentation and to monitor the patient for dysrhythmia. CPT [id number]    Oxygen Saturation Interpretation: 98 % on room air. Normal    Counseling:   I have spoken with the patient and discussed todays results, in addition to providing specific details for the plan of care and counseling regarding the diagnosis and prognosis. Questions are answered at this time and they are agreeable with the plan.     --------------------------------- IMPRESSION AND DISPOSITION ---------------------------------    IMPRESSION  1. Hallucinations    2. Substance use disorder    3. History of HIV infection ([facility] [address]    4. Transaminitis    5. Suicidal ideation        DISPOSITION  Disposition: Pending psychiatry evaluation  Patient condition is fair    IDr. Genoveva, am the primary provider of record    NOTE: This report was transcribed using voice recognition software.  Every effort was made to ensure accuracy; however, inadvertent computerized transcription errors may be present       Genoveva Rodriguez MD  09/02/21 7608

## 2021-09-03 PROBLEM — F23 ACUTE PSYCHOSIS (HCC): Status: ACTIVE | Noted: 2021-09-03

## 2021-09-03 PROBLEM — F15.10 AMPHETAMINE ABUSE (HCC): Status: ACTIVE | Noted: 2021-09-03

## 2021-09-03 PROCEDURE — 6370000000 HC RX 637 (ALT 250 FOR IP): Performed by: STUDENT IN AN ORGANIZED HEALTH CARE EDUCATION/TRAINING PROGRAM

## 2021-09-03 PROCEDURE — 6360000002 HC RX W HCPCS: Performed by: NURSE PRACTITIONER

## 2021-09-03 PROCEDURE — 6370000000 HC RX 637 (ALT 250 FOR IP): Performed by: NURSE PRACTITIONER

## 2021-09-03 PROCEDURE — 1240000000 HC EMOTIONAL WELLNESS R&B

## 2021-09-03 PROCEDURE — 99221 1ST HOSP IP/OBS SF/LOW 40: CPT | Performed by: NURSE PRACTITIONER

## 2021-09-03 PROCEDURE — 94640 AIRWAY INHALATION TREATMENT: CPT

## 2021-09-03 RX ORDER — TRAZODONE HYDROCHLORIDE 50 MG/1
50 TABLET ORAL NIGHTLY PRN
Status: DISCONTINUED | OUTPATIENT
Start: 2021-09-03 | End: 2021-09-08 | Stop reason: HOSPADM

## 2021-09-03 RX ORDER — POLYETHYLENE GLYCOL 3350 17 G
2 POWDER IN PACKET (EA) ORAL PRN
Status: DISCONTINUED | OUTPATIENT
Start: 2021-09-03 | End: 2021-09-08 | Stop reason: HOSPADM

## 2021-09-03 RX ORDER — BUDESONIDE 0.25 MG/2ML
250 INHALANT ORAL 2 TIMES DAILY
Status: DISCONTINUED | OUTPATIENT
Start: 2021-09-03 | End: 2021-09-08 | Stop reason: HOSPADM

## 2021-09-03 RX ORDER — ALBUTEROL SULFATE 2.5 MG/3ML
2.5 SOLUTION RESPIRATORY (INHALATION) EVERY 4 HOURS PRN
Status: DISCONTINUED | OUTPATIENT
Start: 2021-09-03 | End: 2021-09-08 | Stop reason: HOSPADM

## 2021-09-03 RX ORDER — NICOTINE 21 MG/24HR
1 PATCH, TRANSDERMAL 24 HOURS TRANSDERMAL DAILY
Status: DISCONTINUED | OUTPATIENT
Start: 2021-09-03 | End: 2021-09-08 | Stop reason: HOSPADM

## 2021-09-03 RX ORDER — HALOPERIDOL 5 MG/ML
5 INJECTION INTRAMUSCULAR EVERY 6 HOURS PRN
Status: DISCONTINUED | OUTPATIENT
Start: 2021-09-03 | End: 2021-09-08 | Stop reason: HOSPADM

## 2021-09-03 RX ORDER — TRAZODONE HYDROCHLORIDE 50 MG/1
50 TABLET ORAL NIGHTLY PRN
Status: DISCONTINUED | OUTPATIENT
Start: 2021-09-03 | End: 2021-09-03

## 2021-09-03 RX ORDER — MAGNESIUM HYDROXIDE/ALUMINUM HYDROXICE/SIMETHICONE 120; 1200; 1200 MG/30ML; MG/30ML; MG/30ML
30 SUSPENSION ORAL PRN
Status: DISCONTINUED | OUTPATIENT
Start: 2021-09-03 | End: 2021-09-08 | Stop reason: HOSPADM

## 2021-09-03 RX ORDER — HYDROXYZINE PAMOATE 50 MG/1
50 CAPSULE ORAL 3 TIMES DAILY PRN
Status: DISCONTINUED | OUTPATIENT
Start: 2021-09-03 | End: 2021-09-08 | Stop reason: HOSPADM

## 2021-09-03 RX ORDER — HALOPERIDOL 5 MG
5 TABLET ORAL EVERY 6 HOURS PRN
Status: DISCONTINUED | OUTPATIENT
Start: 2021-09-03 | End: 2021-09-08 | Stop reason: HOSPADM

## 2021-09-03 RX ORDER — ACETAMINOPHEN 325 MG/1
650 TABLET ORAL EVERY 6 HOURS PRN
Status: DISCONTINUED | OUTPATIENT
Start: 2021-09-03 | End: 2021-09-08 | Stop reason: HOSPADM

## 2021-09-03 RX ORDER — PANTOPRAZOLE SODIUM 40 MG/1
40 TABLET, DELAYED RELEASE ORAL NIGHTLY
Status: DISCONTINUED | OUTPATIENT
Start: 2021-09-03 | End: 2021-09-08 | Stop reason: HOSPADM

## 2021-09-03 RX ORDER — HYDROXYZINE HYDROCHLORIDE 10 MG/1
50 TABLET, FILM COATED ORAL 3 TIMES DAILY PRN
Status: DISCONTINUED | OUTPATIENT
Start: 2021-09-03 | End: 2021-09-03

## 2021-09-03 RX ORDER — OLANZAPINE 10 MG/1
10 TABLET ORAL NIGHTLY
Status: DISCONTINUED | OUTPATIENT
Start: 2021-09-03 | End: 2021-09-06

## 2021-09-03 RX ORDER — CLOBETASOL PROPIONATE 0.5 MG/G
CREAM TOPICAL 2 TIMES DAILY PRN
Status: DISCONTINUED | OUTPATIENT
Start: 2021-09-03 | End: 2021-09-08 | Stop reason: HOSPADM

## 2021-09-03 RX ADMIN — TRAZODONE HYDROCHLORIDE 50 MG: 50 TABLET ORAL at 21:44

## 2021-09-03 RX ADMIN — PANTOPRAZOLE SODIUM 40 MG: 40 TABLET, DELAYED RELEASE ORAL at 21:42

## 2021-09-03 RX ADMIN — NYSTATIN 500000 UNITS: 100000 SUSPENSION ORAL at 13:46

## 2021-09-03 RX ADMIN — NICOTINE POLACRILEX 2 MG: 2 LOZENGE ORAL at 09:06

## 2021-09-03 RX ADMIN — NYSTATIN 500000 UNITS: 100000 SUSPENSION ORAL at 17:02

## 2021-09-03 RX ADMIN — BUDESONIDE 250 MCG: 0.25 SUSPENSION RESPIRATORY (INHALATION) at 14:08

## 2021-09-03 RX ADMIN — NYSTATIN 500000 UNITS: 100000 SUSPENSION ORAL at 21:42

## 2021-09-03 RX ADMIN — NYSTATIN 500000 UNITS: 100000 SUSPENSION ORAL at 09:05

## 2021-09-03 RX ADMIN — BICTEGRAVIR SODIUM, EMTRICITABINE, AND TENOFOVIR ALAFENAMIDE FUMARATE 1 TABLET: 50; 200; 25 TABLET ORAL at 21:40

## 2021-09-03 RX ADMIN — OLANZAPINE 10 MG: 10 TABLET, FILM COATED ORAL at 21:41

## 2021-09-03 RX ADMIN — NICOTINE POLACRILEX 2 MG: 2 LOZENGE ORAL at 13:47

## 2021-09-03 RX ADMIN — CLOBETASOL PROPIONATE: 0.5 CREAM TOPICAL at 21:41

## 2021-09-03 RX ADMIN — ALBUTEROL SULFATE 2.5 MG: 2.5 SOLUTION RESPIRATORY (INHALATION) at 14:08

## 2021-09-03 ASSESSMENT — PATIENT HEALTH QUESTIONNAIRE - PHQ9: SUM OF ALL RESPONSES TO PHQ QUESTIONS 1-9: 12

## 2021-09-03 ASSESSMENT — SLEEP AND FATIGUE QUESTIONNAIRES
DO YOU HAVE DIFFICULTY SLEEPING: NO
AVERAGE NUMBER OF SLEEP HOURS: 8
DO YOU USE A SLEEP AID: NO

## 2021-09-03 ASSESSMENT — LIFESTYLE VARIABLES: HISTORY_ALCOHOL_USE: NO

## 2021-09-03 ASSESSMENT — PAIN SCALES - GENERAL
PAINLEVEL_OUTOF10: 0
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
## 2021-09-03 NOTE — ED NOTES
Bed: [facility]  Expected date:   Expected time:   Means of arrival:   Comments:  nemesio Mi RN  09/02/21 1047
Patient A&Ox4, respirations non-labored, skin warm/dry, no distress noted. Patient calm and cooperative. Patient denies SI or HI at present. Updated patient on bed assignment.       Benjamin Jauregui RN  09/03/21 7414
Pt accepted for admission by Dr. Gonzalo Carl, RN  09/02/21 2483
Pt has been accepted to [facility] unit by Dr Stephanie Senior. Pt will be going to room 7524. Admitting is aware of bed assignment. GABE nurse was informed to call [facility]2N and put into patient transport.       KAMERON Vaughn, [facility]  09/02/21 3949
Pt has been very cooperative and friendly all day. Now however, pt is sitting in a chair staring at the bathroom speaking to his \"mama\". Pt says he can, \"hear her walker. \" Pt has been redirected and has been told that no one is there but he refuses to believe that and does not understand how we can see her. This rn will continue to monitor pt and watch over his safety.       Mariana Yuan RN  09/02/21 5697
Report given to [facility], ALONSO.        Ankit Oliveira RN  09/02/21 1110
Transport requested. [facility] tech to remove all patient belongings from locker for transport.       Pacheco Portillo RN  09/03/21 9248
While physician was evaluating patient, this patient disclosed a history of HIV, chart updated.       Ron Orozco RN  09/02/21 8881
methamphetamine use. Patient reports that he has been in and out of treatment in the past.    Gender  [x] Male [] Female [] Transgender  [] Other    Sexual Orientation    [] Heterosexual [x] Homosexual [] Bisexual [] Other    Suicidal Behavioral: CSSR-S Complete. [] Reports:    [] Past [] Present   [x] Denies    Homicidal/ Violent Behavior  [] Reports:   [] Past [] Present   [x] Denies     Hallucinations/Delusions   [x] Reports:  Paranoia, delusions  [] Denies     Substance Use/Alcohol Use/Addiction: SBIRT Screen Complete. [x] Reports:  Methamphetamine  [] Denies     Trauma History  [] Reports:  [] Denies     Collateral Information:       Level of Care/Disposition Plan  [] Home:   [] Outpatient Provider:   [] Crisis Unit:   [x] Inpatient Psychiatric Unit:  [] Other:      SW will pursue inpatient admission for safety/stabilization.        KAMERON Benz, JACOBYW  09/02/21 1532

## 2021-09-03 NOTE — PLAN OF CARE
[facility]  Initial Interdisciplinary Treatment Plan NOTE    Review Date & Time: 0900 9/3/21    Patient was not in treatment team    Admission Type:   Admission Type: Involuntary    Reason for admission:  Reason for Admission: \"pPsychotic episode on meth and fentanyl. Estimated Length of Stay Update:  3-5 days  Estimated Discharge Date Update: 9/2/21    EDUCATION:   Learner Progress Toward Treatment Goals: Reviewed results and recommendations of this team, Reviewed group plan and strategies and Reviewed goals and plan of care    Method: Individual    Outcome: Verbalized understanding    PATIENT GOALS: none    PLAN/TREATMENT RECOMMENDATIONS UPDATE: Patient is encouraged to continue to work towards discharge goal by complying with medications, attending groups and to seek staff if feelings are overwhelming. Staff will offer support and interventions as requested or required. Staff will monitor effects of medications and document patient's mood and behaviors.      GOALS UPDATE:   Time frame for Short-Term Goals: 1-3 days    [place] Fu RN How Severe Are Your Spot(S)?: mild What Is The Reason For Today's Visit?: Skin Lesions

## 2021-09-04 LAB
CHOLESTEROL, TOTAL: 125 MG/DL (ref 0–199)
HBA1C MFR BLD: 5.8 % (ref 4–5.6)
HDLC SERPL-MCNC: 34 MG/DL
LDL CHOLESTEROL CALCULATED: 66 MG/DL (ref 0–99)
TRIGL SERPL-MCNC: 127 MG/DL (ref 0–149)
VLDLC SERPL CALC-MCNC: 25 MG/DL

## 2021-09-04 PROCEDURE — 1240000000 HC EMOTIONAL WELLNESS R&B

## 2021-09-04 PROCEDURE — 94640 AIRWAY INHALATION TREATMENT: CPT

## 2021-09-04 PROCEDURE — 6370000000 HC RX 637 (ALT 250 FOR IP): Performed by: NURSE PRACTITIONER

## 2021-09-04 PROCEDURE — 6370000000 HC RX 637 (ALT 250 FOR IP): Performed by: STUDENT IN AN ORGANIZED HEALTH CARE EDUCATION/TRAINING PROGRAM

## 2021-09-04 PROCEDURE — 94760 N-INVAS EAR/PLS OXIMETRY 1: CPT

## 2021-09-04 PROCEDURE — 83036 HEMOGLOBIN GLYCOSYLATED A1C: CPT

## 2021-09-04 PROCEDURE — 6360000002 HC RX W HCPCS: Performed by: NURSE PRACTITIONER

## 2021-09-04 PROCEDURE — 80061 LIPID PANEL: CPT

## 2021-09-04 PROCEDURE — 36415 COLL VENOUS BLD VENIPUNCTURE: CPT

## 2021-09-04 PROCEDURE — 99231 SBSQ HOSP IP/OBS SF/LOW 25: CPT | Performed by: NURSE PRACTITIONER

## 2021-09-04 RX ADMIN — BICTEGRAVIR SODIUM, EMTRICITABINE, AND TENOFOVIR ALAFENAMIDE FUMARATE 1 TABLET: 50; 200; 25 TABLET ORAL at 22:06

## 2021-09-04 RX ADMIN — NYSTATIN 500000 UNITS: 100000 SUSPENSION ORAL at 22:07

## 2021-09-04 RX ADMIN — BUDESONIDE 250 MCG: 0.25 SUSPENSION RESPIRATORY (INHALATION) at 17:00

## 2021-09-04 RX ADMIN — ALBUTEROL SULFATE 2.5 MG: 2.5 SOLUTION RESPIRATORY (INHALATION) at 16:56

## 2021-09-04 RX ADMIN — PANTOPRAZOLE SODIUM 40 MG: 40 TABLET, DELAYED RELEASE ORAL at 22:07

## 2021-09-04 RX ADMIN — NYSTATIN 500000 UNITS: 100000 SUSPENSION ORAL at 14:41

## 2021-09-04 RX ADMIN — OLANZAPINE 10 MG: 10 TABLET, FILM COATED ORAL at 22:06

## 2021-09-04 RX ADMIN — HYDROXYZINE PAMOATE 50 MG: 50 CAPSULE ORAL at 22:06

## 2021-09-04 ASSESSMENT — PAIN SCALES - GENERAL: PAINLEVEL_OUTOF10: 0

## 2021-09-04 NOTE — PROGRESS NOTES
Patient was resting in his room,easily awoken. Denies thoughts of ham to self or others. Verbalizes auditory hallucinations telling him he is a piece of shit, degrading him. Visual hallucinations of someone known to him,but will not say whom. Verbalizes anxiety is 10 out of 10 and depression is 10 out of 10. Denies withdrawal symptoms at this time. Verbalizes appetite is good. Verbalizes no problem with sleep. Compliant with medications. No groups are noted that patient attended today. Will continue to monitor.

## 2021-09-04 NOTE — PLAN OF CARE
Patient denies SI, HI, hallucinations, and physical complaints. Patient is isolative to room, evasive, flat, and depressed.

## 2021-09-04 NOTE — PROGRESS NOTES
BEHAVIORAL HEALTH FOLLOW-UP NOTE     9/4/2021     Patient was seen and examined in person, Chart reviewed   Patient's case discussed with staff/team    Chief Complaint: \"I am very tired. \"    Interim History:     Patient observed in his room this morning he states that he is very tired. He has difficulty participating in assessment. Patient is taking his medications as prescribed, assessment is minimal due to patient sleeping mostly during assessment.   Appetite:  [x] Normal/Unchanged  [] Increased  [] Decreased      Sleep:       [x] Normal/Unchanged  [] Fair       [] Poor              Energy:    [x] Normal/Unchanged  [] Increased  [] Decreased        SI [] Present  [x] Absent    HI  []Present  [x] Absent     Aggression:  [] yes  [x] no    Patient is [x] able  [] unable to CONTRACT FOR SAFETY     PAST MEDICAL/PSYCHIATRIC HISTORY:   Past Medical History:   Diagnosis Date    Asthma     GERD (gastroesophageal reflux disease)     HIV [facility]human immunodeficiency virus infection) ([facility] [address]     Psoriasis        FAMILY/SOCIAL HISTORY:  Family History   Problem Relation Age of Onset    Arthritis Mother     Other Mother     Arthritis Brother      Social History     Socioeconomic History    Marital status: Single     Spouse name: Not on file    Number of children: Not on file    Years of education: Not on file    Highest education level: Not on file   Occupational History    Not on file   Tobacco Use    Smoking status: Current Every Day Smoker     Packs/day: 1.00     Types: Cigarettes    Smokeless tobacco: Never Used   Vaping Use    Vaping Use: Never used   Substance and Sexual Activity    Alcohol use: Not Currently     Comment: Sober Living    Drug use: Yes     Types: Methamphetamines, Other-see comments     Comment: patient states he took Fentanyl prior to arrival to ED 9-2-2021     Sexual activity: Yes     Partners: Male   Other Topics Concern    Not on file   Social History Narrative    Not on file     Social Determinants of Health     Financial Resource Strain:     Difficulty of Paying Living Expenses:    Food Insecurity:     Worried About Running Out of Food in the Last Year:     [address] in the Last Year:    Transportation Needs:     Lack of Transportation (Medical):  Lack of Transportation (Non-Medical):    Physical Activity:     Days of Exercise per Week:     Minutes of Exercise per Session:    Stress:     Feeling of Stress :    Social Connections:     Frequency of Communication with Friends and Family:     Frequency of Social Gatherings with Friends and Family:     Attends [employer] Services:     Active Member of Clubs or Organizations:     Attends Club or Organization Meetings:     Marital Status:    Intimate Partner Violence:     Fear of Current or Ex-Partner:     Emotionally Abused:     Physically Abused:     Sexually Abused:            ROS:  [x] All negative/unchanged except if checked.  Explain positive(checked items) below:  [] Constitutional  [] Eyes  [] Ear/Nose/Mouth/Throat  [] Respiratory  [] CV  [] GI  []   [] Musculoskeletal  [] Skin/Breast  [] Neurological  [] Endocrine  [] Heme/Lymph  [] Allergic/Immunologic    Explanation:     MEDICATIONS:    Current Facility-Administered Medications:     acetaminophen (TYLENOL) tablet 650 mg, 650 mg, Oral, Q6H PRN, Ignacio Gale MD    magnesium hydroxide (MILK OF MAGNESIA) 400 MG/5ML suspension 30 mL, 30 mL, Oral, Daily PRN, Ignacio Gale MD    nicotine (NICODERM CQ) 21 MG/24HR 1 patch, 1 patch, TransDERmal, Daily, Ignacio Gale MD    aluminum & magnesium hydroxide-simethicone (MAALOX) 200-200-20 MG/5ML suspension 30 mL, 30 mL, Oral, PRN, Ignacio Gale MD    haloperidol (HALDOL) tablet 5 mg, 5 mg, Oral, Q6H PRN **OR** haloperidol lactate (HALDOL) injection 5 mg, 5 mg, IntraMUSCular, Q6H PRN, Ignacio Gale MD    traZODone (DESYREL) tablet 50 mg, 50 mg, Oral, Nightly PRN, Charyl Bamberger Dellick, APRN - CNP, 50 mg at 09/03/21 2144    hydrOXYzine (VISTARIL) capsule 50 mg, 50 mg, Oral, TID PRN, Wilma Breach, APRN - CNP    nicotine polacrilex (COMMIT) lozenge 2 mg, 2 mg, Oral, PRN, Wilma Breach, APRN - CNP, 2 mg at 09/03/21 1347    albuterol (PROVENTIL) nebulizer solution 2.5 mg, 2.5 mg, Nebulization, U0L PRN, Earnie Carbine Dellick, APRN - CNP, 2.5 mg at 09/03/21 1408    budesonide (PULMICORT) nebulizer suspension 250 mcg, 250 mcg, Nebulization, BID, Earnie Carbine Dellick, APRN - CNP, 761 mcg at 09/03/21 1408    bictegravir-emtricitab-tenofovir alafenamide (BIKTARVY) -25 MG per tablet 1 tablet, 1 tablet, Oral, Nightly, Earnie Carbine Dellick, APRN - CNP, 1 tablet at 09/03/21 2140    clobetasol (TEMOVATE) 0.05 % cream, , Topical, BID PRN, Wilma Breach, APRN - CNP, Given at 09/03/21 2141    pantoprazole (PROTONIX) tablet 40 mg, 40 mg, Oral, Nightly, Earnie Carbine Dellick, APRN - CNP, 40 mg at 09/03/21 2142    OLANZapine (ZYPREXA) tablet 10 mg, 10 mg, Oral, Nightly, Earnie Carbine Dellick, APRN - CNP, 10 mg at 09/03/21 2141    acetaminophen (TYLENOL) tablet 650 mg, 650 mg, Oral, Once, Carroll Gallardo MD    nystatin (MYCOSTATIN) 855793 UNIT/ML suspension 500,000 Units, 5 mL, Oral, 4x Daily, Carroll Gallardo MD, 500,000 Units at 09/03/21 2142      Examination:  /66   Pulse 66   Temp 98.3 °F (36.8 °C)   Resp 14   Ht 5' 7\" (1.702 m)   Wt 198 lb 11.2 oz (90.1 kg)   SpO2 97%   BMI 31.12 kg/m²   Gait - steady  Medication side effects(SE): none reported  Mental Status Examination:    Level of consciousness:  within normal limits   Appearance:  fair grooming and fair hygiene  Behavior/Motor:  no abnormalities noted  Attitude toward examiner:  cooperative  Speech:  normal rate and normal volume   Mood: euthymic  Affect:  mood congruent  Thought processes:     Thought content:   Cognition:  oriented to person, place, and time   Concentration poor  Insight poor   Judgement poor     ASSESSMENT:   Patient symptoms are:  [] Well controlled  [] Improving  [] Worsening  [x] No change      Diagnosis:   Principal Problem:    Acute psychosis ([facility] [address]  Active Problems:    Amphetamine abuse ([facility] [address]  Resolved Problems:    * No resolved hospital problems. *      LABS:    Recent Labs     09/02/21  1125   WBC 8.1   HGB 14.6        Recent Labs     09/02/21  1125      K 3.9   CL 99   CO2 26   BUN 13   CREATININE 1.2   GLUCOSE 105*     Recent Labs     09/02/21  1125   BILITOT 0.8   ALKPHOS 64   *   ALT 91*     Lab Results   Component Value Date    LABAMPH POSITIVE 09/02/2021    BARBSCNU NOT DETECTED 09/02/2021    LABBENZ NOT DETECTED 09/02/2021    LABMETH NOT DETECTED 09/02/2021    OPIATESCREENURINE NOT DETECTED 09/02/2021    PHENCYCLIDINESCREENURINE NOT DETECTED 09/02/2021    ETOH <10 09/02/2021     No results found for: TSH, FREET4  No results found for: LITHIUM  No results found for: VALPROATE, CBMZ        Treatment Plan:  The patient's diagnosis, treatment plan, medication management were formulated after patient was seen directly by the attending physician and myself and all relevant documentation was reviewed. The patient was referred to outpatient/inpatient substance abuse rehabilitation programming. He was educated multiple times during the hospitalization that if he chooses to continue to use drugs or alcohol, he may potentially act out impulsively, resulting in serious harm to self or others, even though unintentional.  He was also educated that mental health treatment cannot be optimized with ongoing use of drugs. He demonstrated understanding has the capacity to understand that. Reviewed current Medications with the patient. Risk, benefit, side effects, possible outcomes of the medication and alternatives discussed with the patient and the patient demonstrated understanding.   The patient was also educated that the outcome of treatment will depend on the medication compliance as directed by the prescribers along with regular follow-up, compliance with the labs and other work-up, as clinically indicated. Collateral information: Followed by social work  CD evaluation  Encourage patient to attend group and other milieu activities. Discharge planning discussed with the patient and treatment team.    PSYCHOTHERAPY/COUNSELING:  [x] Therapeutic interview  [x] Supportive  [] CBT  [] Ongoing  [] Other    [x] Patient continues to need, on a daily basis, active treatment furnished directly by or requiring the supervision of inpatient psychiatric personnel      Anticipated Length of stay: 5 to 7 days based on stability          NOTE: This report was transcribed using voice recognition software. Every effort was made to ensure accuracy; however, inadvertent computerized transcription errors may be present.   Electronically signed by JUANA Carlos CNP on 9/4/2021 at 12:57 PM

## 2021-09-05 LAB
ABSOLUTE CD 3: 1663 CELLS/UL (ref 570–2400)
ABSOLUTE CD 4 HELPER: 890 CELLS/UL (ref 430–1800)
ABSOLUTE CD 8 (SUPP): 756 CELLS/UL (ref 210–1200)
CD4/CD8 RATIO: 1.18 RATIO (ref 0.8–3.9)
LYMPH SUBSET INFORMATION: NORMAL

## 2021-09-05 PROCEDURE — 99231 SBSQ HOSP IP/OBS SF/LOW 25: CPT | Performed by: NURSE PRACTITIONER

## 2021-09-05 PROCEDURE — 6370000000 HC RX 637 (ALT 250 FOR IP): Performed by: NURSE PRACTITIONER

## 2021-09-05 PROCEDURE — 94640 AIRWAY INHALATION TREATMENT: CPT

## 2021-09-05 PROCEDURE — 6370000000 HC RX 637 (ALT 250 FOR IP): Performed by: STUDENT IN AN ORGANIZED HEALTH CARE EDUCATION/TRAINING PROGRAM

## 2021-09-05 PROCEDURE — 6360000002 HC RX W HCPCS: Performed by: NURSE PRACTITIONER

## 2021-09-05 PROCEDURE — 1240000000 HC EMOTIONAL WELLNESS R&B

## 2021-09-05 RX ADMIN — NYSTATIN 500000 UNITS: 100000 SUSPENSION ORAL at 12:30

## 2021-09-05 RX ADMIN — BUDESONIDE 250 MCG: 0.25 SUSPENSION RESPIRATORY (INHALATION) at 10:35

## 2021-09-05 RX ADMIN — NYSTATIN 500000 UNITS: 100000 SUSPENSION ORAL at 09:44

## 2021-09-05 RX ADMIN — TRAZODONE HYDROCHLORIDE 50 MG: 50 TABLET ORAL at 20:33

## 2021-09-05 RX ADMIN — BICTEGRAVIR SODIUM, EMTRICITABINE, AND TENOFOVIR ALAFENAMIDE FUMARATE 1 TABLET: 50; 200; 25 TABLET ORAL at 20:34

## 2021-09-05 RX ADMIN — OLANZAPINE 10 MG: 10 TABLET, FILM COATED ORAL at 20:34

## 2021-09-05 RX ADMIN — NYSTATIN 500000 UNITS: 100000 SUSPENSION ORAL at 21:00

## 2021-09-05 RX ADMIN — NYSTATIN 500000 UNITS: 100000 SUSPENSION ORAL at 17:05

## 2021-09-05 RX ADMIN — ALBUTEROL SULFATE 2.5 MG: 2.5 SOLUTION RESPIRATORY (INHALATION) at 10:36

## 2021-09-05 RX ADMIN — HYDROXYZINE PAMOATE 50 MG: 50 CAPSULE ORAL at 20:33

## 2021-09-05 RX ADMIN — PANTOPRAZOLE SODIUM 40 MG: 40 TABLET, DELAYED RELEASE ORAL at 20:34

## 2021-09-05 ASSESSMENT — PAIN SCALES - GENERAL: PAINLEVEL_OUTOF10: 0

## 2021-09-05 NOTE — PLAN OF CARE
Problem: Altered Mood, Psychotic Behavior:  Goal: Able to demonstrate trust by eating, participating in treatment and following staff's direction  Description: Able to demonstrate trust by eating, participating in treatment and following staff's direction  9/5/2021 1631 by Lexy Shah RN  Outcome: Ongoing     Problem: Altered Mood, Psychotic Behavior:  Goal: Able to verbalize decrease in frequency and intensity of hallucinations  Description: Able to verbalize decrease in frequency and intensity of hallucinations  Outcome: Met This Shift     Problem: Altered Mood, Psychotic Behavior:  Goal: Absence of self-harm  Description: Absence of self-harm  Outcome: Ongoing     Problem: Substance Abuse:  Goal: Absence of drug withdrawal signs and symptoms  Description: Absence of drug withdrawal signs and symptoms  Outcome: Ongoing

## 2021-09-05 NOTE — PROGRESS NOTES
BEHAVIORAL HEALTH FOLLOW-UP NOTE     9/5/2021     Patient was seen and examined in person, Chart reviewed   Patient's case discussed with staff/team    Chief Complaint: \"I am very tired. \"    Interim History:     Patient again observed in his room this morning he states that he is very tired. He has difficulty participating in assessment. Patient is taking his medications as prescribed, assessment is minimal due to patient sleeping mostly during assessment. He is isolative, paranoid. He is taking his scheduled meds and there have been no behavioral outbursts.      Appetite:  [x] Normal/Unchanged  [] Increased  [] Decreased      Sleep:       [x] Normal/Unchanged  [] Fair       [] Poor              Energy:    [x] Normal/Unchanged  [] Increased  [] Decreased        SI [] Present  [x] Absent    HI  []Present  [x] Absent     Aggression:  [] yes  [x] no    Patient is [x] able  [] unable to CONTRACT FOR SAFETY     PAST MEDICAL/PSYCHIATRIC HISTORY:   Past Medical History:   Diagnosis Date    Asthma     GERD (gastroesophageal reflux disease)     HIV [facility]human immunodeficiency virus infection) [facility] [address]     Psoriasis        FAMILY/SOCIAL HISTORY:  Family History   Problem Relation Age of Onset    Arthritis Mother     Other Mother     Arthritis Brother      Social History     Socioeconomic History    Marital status: Single     Spouse name: Not on file    Number of children: Not on file    Years of education: Not on file    Highest education level: Not on file   Occupational History    Not on file   Tobacco Use    Smoking status: Current Every Day Smoker     Packs/day: 1.00     Types: Cigarettes    Smokeless tobacco: Never Used   Vaping Use    Vaping Use: Never used   Substance and Sexual Activity    Alcohol use: Not Currently     Comment: Sober Living    Drug use: Yes     Types: Methamphetamines, Other-see comments     Comment: patient states he took Fentanyl prior to arrival to ED 9-2-2021     Sexual activity: Yes Partners: Male   Other Topics Concern    Not on file   Social History Narrative    Not on file     Social Determinants of Health     Financial Resource Strain:     Difficulty of Paying Living Expenses:    Food Insecurity:     Worried About Running Out of Food in the Last Year:     [address] in the Last Year:    Transportation Needs:     Lack of Transportation (Medical):  Lack of Transportation (Non-Medical):    Physical Activity:     Days of Exercise per Week:     Minutes of Exercise per Session:    Stress:     Feeling of Stress :    Social Connections:     Frequency of Communication with Friends and Family:     Frequency of Social Gatherings with Friends and Family:     Attends Judaism Services:     Active Member of Clubs or Organizations:     Attends Club or Organization Meetings:     Marital Status:    Intimate Partner Violence:     Fear of Current or Ex-Partner:     Emotionally Abused:     Physically Abused:     Sexually Abused:            ROS:  [x] All negative/unchanged except if checked.  Explain positive(checked items) below:  [] Constitutional  [] Eyes  [] Ear/Nose/Mouth/Throat  [] Respiratory  [] CV  [] GI  []   [] Musculoskeletal  [] Skin/Breast  [] Neurological  [] Endocrine  [] Heme/Lymph  [] Allergic/Immunologic    Explanation:     MEDICATIONS:    Current Facility-Administered Medications:     acetaminophen (TYLENOL) tablet 650 mg, 650 mg, Oral, Q6H PRN, Ignacio Gale MD    magnesium hydroxide (MILK OF MAGNESIA) 400 MG/5ML suspension 30 mL, 30 mL, Oral, Daily PRN, Ignacio Gale MD    nicotine (NICODERM CQ) 21 MG/24HR 1 patch, 1 patch, TransDERmal, Daily, Ignacio Gale MD    aluminum & magnesium hydroxide-simethicone (MAALOX) 200-200-20 MG/5ML suspension 30 mL, 30 mL, Oral, PRN, Ignacio Gale MD    haloperidol (HALDOL) tablet 5 mg, 5 mg, Oral, Q6H PRN **OR** haloperidol lactate (HALDOL) injection 5 mg, 5 mg, IntraMUSCular, Q6H PRN, April Livingston MD Alexander    traZODone (DESYREL) tablet 50 mg, 50 mg, Oral, Nightly PRN, Jackie Abbie Delavel, APRN - CNP, 50 mg at 09/03/21 2144    hydrOXYzine (VISTARIL) capsule 50 mg, 50 mg, Oral, TID PRN, Seth Oats, APRN - CNP, 50 mg at 09/04/21 2206    nicotine polacrilex (COMMIT) lozenge 2 mg, 2 mg, Oral, PRN, Seth Oats, APRN - CNP, 2 mg at 09/03/21 1347    albuterol (PROVENTIL) nebulizer solution 2.5 mg, 2.5 mg, Nebulization, L7G PRN, Seth Oats, APRN - CNP, 2.5 mg at 09/05/21 1036    budesonide (PULMICORT) nebulizer suspension 250 mcg, 250 mcg, Nebulization, BID, Seth Oats, APRN - CNP, 325 mcg at 09/05/21 1035    bictegravir-emtricitab-tenofovir alafenamide (BIKTARVY) -25 MG per tablet 1 tablet, 1 tablet, Oral, Nightly, Jackie Abbie Mathewslick, APRN - CNP, 1 tablet at 09/04/21 2206    clobetasol (TEMOVATE) 0.05 % cream, , Topical, BID PRN, Seth Oats, APRN - CNP, Given at 09/03/21 2141    pantoprazole (PROTONIX) tablet 40 mg, 40 mg, Oral, Nightly, Jackie Jang, APRN - CNP, 40 mg at 09/04/21 2207    OLANZapine (ZYPREXA) tablet 10 mg, 10 mg, Oral, Nightly, Jackie Mathewslick, APRN - CNP, 10 mg at 09/04/21 2206    acetaminophen (TYLENOL) tablet 650 mg, 650 mg, Oral, Once, Hiram Roberts MD    nystatin (MYCOSTATIN) 002164 UNIT/ML suspension 500,000 Units, 5 mL, Oral, 4x Daily, Hiram Roberts MD, 500,000 Units at 09/05/21 0944      Examination:  /68   Pulse 68   Temp 98.6 °F (37 °C)   Resp 16   Ht 5' 7\" (1.702 m)   Wt 198 lb 11.2 oz (90.1 kg)   SpO2 99%   BMI 31.12 kg/m²   Gait - steady  Medication side effects(SE): none reported  Mental Status Examination:    Level of consciousness:  within normal limits   Appearance:  fair grooming and fair hygiene  Behavior/Motor:  no abnormalities noted  Attitude toward examiner:  cooperative  Speech:  normal rate and normal volume   Mood: euthymic  Affect:  mood congruent  Thought processes:     Thought content:   Cognition:  oriented to person, place, and time

## 2021-09-05 NOTE — PROGRESS NOTES
Pt resting in his room. Alert, calm, polite, and cooperative. Pt denies SI, HI, and AVH. States his paranoia is better but is unsure if that is due to being here as he feels safe here. Pt is friendly. Does not display any behaviors. Pt states he slept all day. Promised to attend groups tomorrow and try to be out on the unit more.  Will continue to monitor

## 2021-09-06 PROCEDURE — 6370000000 HC RX 637 (ALT 250 FOR IP): Performed by: STUDENT IN AN ORGANIZED HEALTH CARE EDUCATION/TRAINING PROGRAM

## 2021-09-06 PROCEDURE — 94640 AIRWAY INHALATION TREATMENT: CPT

## 2021-09-06 PROCEDURE — 6370000000 HC RX 637 (ALT 250 FOR IP): Performed by: NURSE PRACTITIONER

## 2021-09-06 PROCEDURE — 99232 SBSQ HOSP IP/OBS MODERATE 35: CPT | Performed by: NURSE PRACTITIONER

## 2021-09-06 PROCEDURE — 6360000002 HC RX W HCPCS: Performed by: NURSE PRACTITIONER

## 2021-09-06 PROCEDURE — 1240000000 HC EMOTIONAL WELLNESS R&B

## 2021-09-06 RX ADMIN — TRAZODONE HYDROCHLORIDE 50 MG: 50 TABLET ORAL at 21:43

## 2021-09-06 RX ADMIN — BUDESONIDE 250 MCG: 0.25 SUSPENSION RESPIRATORY (INHALATION) at 21:43

## 2021-09-06 RX ADMIN — NYSTATIN 500000 UNITS: 100000 SUSPENSION ORAL at 14:07

## 2021-09-06 RX ADMIN — BICTEGRAVIR SODIUM, EMTRICITABINE, AND TENOFOVIR ALAFENAMIDE FUMARATE 1 TABLET: 50; 200; 25 TABLET ORAL at 21:43

## 2021-09-06 RX ADMIN — PANTOPRAZOLE SODIUM 40 MG: 40 TABLET, DELAYED RELEASE ORAL at 21:43

## 2021-09-06 RX ADMIN — HYDROXYZINE PAMOATE 50 MG: 50 CAPSULE ORAL at 21:43

## 2021-09-06 RX ADMIN — NYSTATIN 500000 UNITS: 100000 SUSPENSION ORAL at 09:51

## 2021-09-06 RX ADMIN — NYSTATIN 500000 UNITS: 100000 SUSPENSION ORAL at 16:34

## 2021-09-06 RX ADMIN — OLANZAPINE 15 MG: 5 TABLET, FILM COATED ORAL at 21:43

## 2021-09-06 RX ADMIN — NYSTATIN 500000 UNITS: 100000 SUSPENSION ORAL at 21:43

## 2021-09-06 ASSESSMENT — PAIN SCALES - GENERAL
PAINLEVEL_OUTOF10: 0

## 2021-09-06 NOTE — PROGRESS NOTES
Methamphetamines, Other-see comments     Comment: patient states he took Fentanyl prior to arrival to ED 9-2-2021     Sexual activity: Yes     Partners: Male   Other Topics Concern    Not on file   Social History Narrative    Not on file     Social Determinants of Health     Financial Resource Strain:     Difficulty of Paying Living Expenses:    Food Insecurity:     Worried About Running Out of Food in the Last Year:     [address] in the Last Year:    Transportation Needs:     Lack of Transportation (Medical):  Lack of Transportation (Non-Medical):    Physical Activity:     Days of Exercise per Week:     Minutes of Exercise per Session:    Stress:     Feeling of Stress :    Social Connections:     Frequency of Communication with Friends and Family:     Frequency of Social Gatherings with Friends and Family:     Attends Orthodoxy Services:     Active Member of Clubs or Organizations:     Attends Club or Organization Meetings:     Marital Status:    Intimate Partner Violence:     Fear of Current or Ex-Partner:     Emotionally Abused:     Physically Abused:     Sexually Abused:            ROS:  [x] All negative/unchanged except if checked.  Explain positive(checked items) below:  [] Constitutional  [] Eyes  [] Ear/Nose/Mouth/Throat  [] Respiratory  [] CV  [] GI  []   [] Musculoskeletal  [] Skin/Breast  [] Neurological  [] Endocrine  [] Heme/Lymph  [] Allergic/Immunologic    Explanation:     MEDICATIONS:    Current Facility-Administered Medications:     acetaminophen (TYLENOL) tablet 650 mg, 650 mg, Oral, Q6H PRN, Mony Rivero MD    magnesium hydroxide (MILK OF MAGNESIA) 400 MG/5ML suspension 30 mL, 30 mL, Oral, Daily PRN, Mony Rivero MD    nicotine (NICODERM CQ) 21 MG/24HR 1 patch, 1 patch, TransDERmal, Daily, Mony Rivero MD    aluminum & magnesium hydroxide-simethicone (MAALOX) 200-200-20 MG/5ML suspension 30 mL, 30 mL, Oral, PRN, Mony Rivero MD    haloperidol (HALDOL) tablet 5 mg, 5 mg, Oral, Q6H PRN **OR** haloperidol lactate (HALDOL) injection 5 mg, 5 mg, IntraMUSCular, Q6H PRN, Leopold Garner, MD    traZODone (DESYREL) tablet 50 mg, 50 mg, Oral, Nightly PRN, JUANA Gupta - CNP, 50 mg at 09/05/21 2033    hydrOXYzine (VISTARIL) capsule 50 mg, 50 mg, Oral, TID PRN, JUANA Gupta - CNP, 50 mg at 09/05/21 2033    nicotine polacrilex (COMMIT) lozenge 2 mg, 2 mg, Oral, PRN, JUANA Gupta - CNP, 2 mg at 09/03/21 1347    albuterol (PROVENTIL) nebulizer solution 2.5 mg, 2.5 mg, Nebulization, Y6G PRN, JUANA Gupta - CNP, 2.5 mg at 09/05/21 1036    budesonide (PULMICORT) nebulizer suspension 250 mcg, 250 mcg, Nebulization, BID, Dayne Jang APRN - CNP, 506 mcg at 09/05/21 1035    bictegravir-emtricitab-tenofovir alafenamide (BIKTARVY) -25 MG per tablet 1 tablet, 1 tablet, Oral, Nightly, JUANA Cm - CNP, 1 tablet at 09/05/21 2034    clobetasol (TEMOVATE) 0.05 % cream, , Topical, BID PRN, JUANA Gupta - CNP, Given at 09/03/21 2141    pantoprazole (PROTONIX) tablet 40 mg, 40 mg, Oral, Nightly, Dayne Jang APRN - CNP, 40 mg at 09/05/21 2034    OLANZapine (ZYPREXA) tablet 10 mg, 10 mg, Oral, Nightly, Dayne Jang, APRN - CNP, 10 mg at 09/05/21 2034    acetaminophen (TYLENOL) tablet 650 mg, 650 mg, Oral, Once, Blessing Guerrero MD    nystatin (MYCOSTATIN) 466270 UNIT/ML suspension 500,000 Units, 5 mL, Oral, 4x Daily, Blessing Guerrero MD, 500,000 Units at 09/05/21 2100      Examination:  /63   Pulse 57   Temp 98.5 °F (36.9 °C)   Resp 15   Ht 5' 7\" (1.702 m)   Wt 198 lb 11.2 oz (90.1 kg)   SpO2 96%   BMI 31.12 kg/m²   Gait - steady  Medication side effects(SE): none reported  Mental Status Examination:    Level of consciousness:  within normal limits   Appearance:  fair grooming and fair hygiene  Behavior/Motor:  no abnormalities noted  Attitude toward examiner:  cooperative  Speech:  normal rate and normal volume   Mood: euthymic  Affect:  mood congruent  Thought processes: Thought content:   Cognition:  oriented to person, place, and time   Concentration poor  Insight poor   Judgement poor     ASSESSMENT:   Patient symptoms are:  [] Well controlled  [] Improving  [] Worsening  [x] No change      Diagnosis:   Principal Problem:    Acute psychosis ([facility]  Active Problems:    Amphetamine abuse ([facility]  Resolved Problems:    * No resolved hospital problems. *      LABS:    No results for input(s): WBC, HGB, PLT in the last 72 hours. No results for input(s): NA, K, CL, CO2, BUN, CREATININE, GLUCOSE in the last 72 hours. No results for input(s): BILITOT, ALKPHOS, AST, ALT in the last 72 hours. Lab Results   Component Value Date    LABAMPH POSITIVE 09/02/2021    BARBSCNU NOT DETECTED 09/02/2021    LABBENZ NOT DETECTED 09/02/2021    LABMETH NOT DETECTED 09/02/2021    OPIATESCREENURINE NOT DETECTED 09/02/2021    PHENCYCLIDINESCREENURINE NOT DETECTED 09/02/2021    ETOH <10 09/02/2021     No results found for: TSH, FREET4  No results found for: LITHIUM  No results found for: VALPROATE, CBMZ        Treatment Plan:  The patient's diagnosis, treatment plan, medication management were formulated after patient was seen directly by the attending physician and myself and all relevant documentation was reviewed. The patient was referred to outpatient/inpatient substance abuse rehabilitation programming. He was educated multiple times during the hospitalization that if he chooses to continue to use drugs or alcohol, he may potentially act out impulsively, resulting in serious harm to self or others, even though unintentional.  He was also educated that mental health treatment cannot be optimized with ongoing use of drugs. He demonstrated understanding has the capacity to understand that. Reviewed current Medications with the patient.   Risk, benefit, side effects, possible outcomes of the medication and alternatives discussed with the patient and the patient demonstrated understanding. The patient was also educated that the outcome of treatment will depend on the medication compliance as directed by the prescribers along with regular follow-up, compliance with the labs and other work-up, as clinically indicated. Collateral information: Followed by social work  CD evaluation  Encourage patient to attend group and other milieu activities. Discharge planning discussed with the patient and treatment team.    Increase Zyprexa to 15 mg at bedtime    PSYCHOTHERAPY/COUNSELING:  [x] Therapeutic interview  [x] Supportive  [] CBT  [] Ongoing  [] Other    [x] Patient continues to need, on a daily basis, active treatment furnished directly by or requiring the supervision of inpatient psychiatric personnel      Anticipated Length of stay: 5 to 7 days based on stability          NOTE: This report was transcribed using voice recognition software. Every effort was made to ensure accuracy; however, inadvertent computerized transcription errors may be present.   Electronically signed by JUANA Menezes CNP on 7/6/7423 at 9:35 AM

## 2021-09-06 NOTE — PROGRESS NOTES
PT. IN ROOM ALL SHIFT, EATS MEALS IN ROOM AND ATTENDED NO GROUPS THIS A.M. PT. REMAINS MEDICATION COMPLIANT WITH NO C/O SIDE EFFECTS. PT. REPORTS TENDS TO ISOLATE OTHERS, BUT \"WHEN I BOND WITH SOMEONE, YOU CAN'T GET ME TO SHUT UP\". PT. DENIED SUICIDAL IDEATIONS, HOMICIDAL IDEATIONS AND HALLUCINATIONS ON A.M. ASSESSMENT. PT. WAS GUARDED NAD HESITANT, BUT VOICED NO DELUSIONS. WILL CONTINUE TO MONITOR FOR ANY ACUTE CHANGE IN STATUS.

## 2021-09-06 NOTE — PLAN OF CARE
Problem: Altered Mood, Psychotic Behavior:  Goal: Able to verbalize decrease in frequency and intensity of hallucinations  Description: Able to verbalize decrease in frequency and intensity of hallucinations  9/6/2021 0907 by Mohan Munson RN  Outcome: Met This Shift  PT. DENIED ACTIVE HALLUCINATIONS ON A.M. ASSESSMENT.   9/6/2021 0402 by Rennie Phoenix, RN  Outcome: Ongoing     Problem: Altered Mood, Psychotic Behavior:  Goal: Able to verbalize reality based thinking  Description: Able to verbalize reality based thinking  9/6/2021 0907 by Mohan Munson RN  Outcome: Met This Shift  GUARDED, HESITANT ON A.M. ASSESSMENT.  NO VOICED DELUSIONS.   9/6/2021 0402 by Rennie Phoenix, RN  Outcome: Ongoing     Problem: Altered Mood, Psychotic Behavior:  Goal: Absence of self-harm  Description: Absence of self-harm  9/6/2021 0907 by Mohan Munson RN  Outcome: Met This Shift  NO SELF HARM.   9/6/2021 0402 by Rennie Phoenix, RN  Outcome: [facility] Interdisciplinary Treatment Plan NOTE    Review Date & Time: 9/6/21 0900    Patient was in treatment team    Estimated Length of Stay Update:  5 DAYS  Estimated Discharge Date Update:  TOMORROW    EDUCATION:   Learner Progress Toward Treatment Goals: Reviewed results and recommendations of this team    Method: Small group    Outcome: Verbalized understanding and Needs reinforcement    PATIENT GOALS: \"TRY TO GET TO A GROUP\"    PLAN/TREATMENT RECOMMENDATIONS UPDATE: ENCOURAGE GROUPS, ASSESS FOR PSYCHOSIS, POSSIBLE DISCHARGE TOMORROW BACK TO SOBER LIVING     GOALS UPDATE:   Time frame for Short-Term Goals:  5 DAYS      Mohan Munson RN

## 2021-09-07 PROCEDURE — 6370000000 HC RX 637 (ALT 250 FOR IP): Performed by: STUDENT IN AN ORGANIZED HEALTH CARE EDUCATION/TRAINING PROGRAM

## 2021-09-07 PROCEDURE — 6360000002 HC RX W HCPCS: Performed by: NURSE PRACTITIONER

## 2021-09-07 PROCEDURE — 99232 SBSQ HOSP IP/OBS MODERATE 35: CPT | Performed by: NURSE PRACTITIONER

## 2021-09-07 PROCEDURE — 6370000000 HC RX 637 (ALT 250 FOR IP): Performed by: NURSE PRACTITIONER

## 2021-09-07 PROCEDURE — 1240000000 HC EMOTIONAL WELLNESS R&B

## 2021-09-07 PROCEDURE — 94640 AIRWAY INHALATION TREATMENT: CPT

## 2021-09-07 RX ADMIN — BUDESONIDE 250 MCG: 0.25 SUSPENSION RESPIRATORY (INHALATION) at 14:24

## 2021-09-07 RX ADMIN — NYSTATIN 500000 UNITS: 100000 SUSPENSION ORAL at 20:42

## 2021-09-07 RX ADMIN — TRAZODONE HYDROCHLORIDE 50 MG: 50 TABLET ORAL at 20:41

## 2021-09-07 RX ADMIN — ALBUTEROL SULFATE 2.5 MG: 2.5 SOLUTION RESPIRATORY (INHALATION) at 21:07

## 2021-09-07 RX ADMIN — NYSTATIN 500000 UNITS: 100000 SUSPENSION ORAL at 10:58

## 2021-09-07 RX ADMIN — OLANZAPINE 15 MG: 5 TABLET, FILM COATED ORAL at 20:41

## 2021-09-07 RX ADMIN — BICTEGRAVIR SODIUM, EMTRICITABINE, AND TENOFOVIR ALAFENAMIDE FUMARATE 1 TABLET: 50; 200; 25 TABLET ORAL at 20:41

## 2021-09-07 RX ADMIN — PANTOPRAZOLE SODIUM 40 MG: 40 TABLET, DELAYED RELEASE ORAL at 20:41

## 2021-09-07 RX ADMIN — ALBUTEROL SULFATE 2.5 MG: 2.5 SOLUTION RESPIRATORY (INHALATION) at 14:24

## 2021-09-07 RX ADMIN — NYSTATIN 500000 UNITS: 100000 SUSPENSION ORAL at 12:22

## 2021-09-07 RX ADMIN — BUDESONIDE 250 MCG: 0.25 SUSPENSION RESPIRATORY (INHALATION) at 21:06

## 2021-09-07 ASSESSMENT — PAIN SCALES - GENERAL
PAINLEVEL_OUTOF10: 0
PAINLEVEL_OUTOF10: 0

## 2021-09-07 NOTE — PLAN OF CARE
Problem: Altered Mood, Psychotic Behavior:  Goal: Able to demonstrate trust by eating, participating in treatment and following staff's direction  Description: Able to demonstrate trust by eating, participating in treatment and following staff's direction  9/7/2021 1003 by Allyson Manzo RN  Outcome: Ongoing  9/6/2021 2116 by Frank Rocha RN  Outcome: Met This Shift  Goal: Able to verbalize decrease in frequency and intensity of hallucinations  Description: Able to verbalize decrease in frequency and intensity of hallucinations  9/7/2021 1003 by Allyson Manzo RN  Outcome: Ongoing  9/6/2021 2116 by Frank Rocha RN  Outcome: Met This Shift  Goal: Able to verbalize reality based thinking  Description: Able to verbalize reality based thinking  Outcome: Met This Shift  Goal: Absence of self-harm  Description: Absence of self-harm  9/7/2021 1003 by Allyson Manzo RN  Outcome: Ongoing  9/6/2021 2116 by Frank Rocha RN  Outcome: Met This Shift     Problem: Substance Abuse:  Goal: Absence of drug withdrawal signs and symptoms  Description: Absence of drug withdrawal signs and symptoms  Outcome: Met This Shift

## 2021-09-07 NOTE — GROUP NOTE
Group Therapy Note    Date: 9/6/2021    Group Start Time: 1935  Group End Time: 2014  Group Topic: Wrap-Up    SEY[facility] [address] [facility] [address] [address] [place], RN        Group Therapy Note    Attendees: 15/21

## 2021-09-07 NOTE — PROGRESS NOTES
BEHAVIORAL HEALTH FOLLOW-UP NOTE     9/7/2021     Patient was seen and examined in person, Chart reviewed   Patient's case discussed with staff/team    Chief Complaint: \"I am doing good. \"    Interim History:     Patient up on the unit bright pleasant affect he is smiling is been attending groups and socialize with peers. He is future oriented wanting to go to welding classes. He wants to go back to the sober living house as a skilled transportation.   Denies SI/HI intent or plan denies any auditory visual hallucinations eating well sleeping well no neurovegetative signs of depression    Appetite:  [x] Normal/Unchanged  [] Increased  [] Decreased      Sleep:       [x] Normal/Unchanged  [] Fair       [] Poor              Energy:    [x] Normal/Unchanged  [] Increased  [] Decreased        SI [] Present  [x] Absent    HI  []Present  [x] Absent     Aggression:  [] yes  [x] no    Patient is [x] able  [] unable to CONTRACT FOR SAFETY     PAST MEDICAL/PSYCHIATRIC HISTORY:   Past Medical History:   Diagnosis Date    Asthma     GERD (gastroesophageal reflux disease)     HIV (human immunodeficiency virus infection) ([facility]     Psoriasis        FAMILY/SOCIAL HISTORY:  Family History   Problem Relation Age of Onset    Arthritis Mother     Other Mother     Arthritis Brother      Social History     Socioeconomic History    Marital status: Single     Spouse name: Not on file    Number of children: Not on file    Years of education: Not on file    Highest education level: Not on file   Occupational History    Not on file   Tobacco Use    Smoking status: Current Every Day Smoker     Packs/day: 1.00     Types: Cigarettes    Smokeless tobacco: Never Used   Vaping Use    Vaping Use: Never used   Substance and Sexual Activity    Alcohol use: Not Currently     Comment: Sober Living    Drug use: Yes     Types: Methamphetamines, Other-see comments     Comment: patient states he took Fentanyl prior to arrival to ED 9-2-2021  Sexual activity: Yes     Partners: Male   Other Topics Concern    Not on file   Social History Narrative    Not on file     Social Determinants of Health     Financial Resource Strain:     Difficulty of Paying Living Expenses:    Food Insecurity:     Worried About Running Out of Food in the Last Year:     [address] in the Last Year:    Transportation Needs:     Lack of Transportation (Medical):  Lack of Transportation (Non-Medical):    Physical Activity:     Days of Exercise per Week:     Minutes of Exercise per Session:    Stress:     Feeling of Stress :    Social Connections:     Frequency of Communication with Friends and Family:     Frequency of Social Gatherings with Friends and Family:     Attends Sabianism Services:     Active Member of Clubs or Organizations:     Attends Club or Organization Meetings:     Marital Status:    Intimate Partner Violence:     Fear of Current or Ex-Partner:     Emotionally Abused:     Physically Abused:     Sexually Abused:            ROS:  [x] All negative/unchanged except if checked.  Explain positive(checked items) below:  [] Constitutional  [] Eyes  [] Ear/Nose/Mouth/Throat  [] Respiratory  [] CV  [] GI  []   [] Musculoskeletal  [] Skin/Breast  [] Neurological  [] Endocrine  [] Heme/Lymph  [] Allergic/Immunologic    Explanation:     MEDICATIONS:    Current Facility-Administered Medications:     OLANZapine (ZYPREXA) tablet 15 mg, 15 mg, Oral, Nightly, Melani Jang, APRN - CNP, 15 mg at 09/06/21 2143    acetaminophen (TYLENOL) tablet 650 mg, 650 mg, Oral, Q6H PRN, Rober Serrato MD    magnesium hydroxide (MILK OF MAGNESIA) 400 MG/5ML suspension 30 mL, 30 mL, Oral, Daily PRN, Rober Serrato MD    nicotine (NICODERM CQ) 21 MG/24HR 1 patch, 1 patch, TransDERmal, Daily, Rober Serrato MD    aluminum & magnesium hydroxide-simethicone (MAALOX) 200-200-20 MG/5ML suspension 30 mL, 30 mL, Oral, PRN, Rober Serrato MD    haloperidol (HALDOL) tablet 5 mg, 5 mg, Oral, Q6H PRN **OR** haloperidol lactate (HALDOL) injection 5 mg, 5 mg, IntraMUSCular, Q6H PRN, Rosangela Cristina MD    traZODone (DESYREL) tablet 50 mg, 50 mg, Oral, Nightly PRN, Angeline Poncho, APRN - CNP, 50 mg at 09/06/21 2143    hydrOXYzine (VISTARIL) capsule 50 mg, 50 mg, Oral, TID PRN, Angeline Poncho, APRN - CNP, 50 mg at 09/06/21 2143    nicotine polacrilex (COMMIT) lozenge 2 mg, 2 mg, Oral, PRN, Angeline Poncho, APRN - CNP, 2 mg at 09/03/21 1347    albuterol (PROVENTIL) nebulizer solution 2.5 mg, 2.5 mg, Nebulization, C4X PRN, Angeline Poncho, APRN - CNP, 2.5 mg at 09/05/21 1036    budesonide (PULMICORT) nebulizer suspension 250 mcg, 250 mcg, Nebulization, BID, Angeline Poncho, APRN - CNP, 990 mcg at 09/06/21 2143    bictegravir-emtricitab-tenofovir alafenamide (BIKTARVY) -25 MG per tablet 1 tablet, 1 tablet, Oral, Nightly, Earnstine Anastasia Dellick, APRN - CNP, 1 tablet at 09/06/21 2143    clobetasol (TEMOVATE) 0.05 % cream, , Topical, BID PRN, Angeline Poncho, APRN - CNP, Given at 09/03/21 2141    pantoprazole (PROTONIX) tablet 40 mg, 40 mg, Oral, Nightly, Earnstine Anastasia Dellick, APRN - CNP, 40 mg at 09/06/21 2143    acetaminophen (TYLENOL) tablet 650 mg, 650 mg, Oral, Once, Markell Orosco MD    nystatin (MYCOSTATIN) [id number] UNIT/ML suspension 500,000 Units, 5 mL, Oral, 4x Daily, Markell Orosco MD, 500,000 Units at 09/06/21 2143      Examination:  /78   Pulse 84   Temp 98.9 °F (37.2 °C) (Oral)   Resp 14   Ht 5' 7\" (1.702 m)   Wt 198 lb 11.2 oz (90.1 kg)   SpO2 96%   BMI 31.12 kg/m²   Gait - steady  Medication side effects(SE): none reported  Mental Status Examination:    Level of consciousness:  within normal limits   Appearance:  fair grooming and fair hygiene  Behavior/Motor:  no abnormalities noted  Attitude toward examiner:  cooperative  Speech:  normal rate and normal volume   Mood: euthymic  Affect:  mood congruent  Thought processes:     Thought content:   Cognition: oriented to person, place, and time   Concentration poor  Insight poor   Judgement poor     ASSESSMENT:   Patient symptoms are:  [] Well controlled  [] Improving  [] Worsening  [x] No change      Diagnosis:   Principal Problem:    Acute psychosis ([facility]  Active Problems:    Amphetamine abuse ([facility]  Resolved Problems:    * No resolved hospital problems. *      LABS:    No results for input(s): WBC, HGB, PLT in the last 72 hours. No results for input(s): NA, K, CL, CO2, BUN, CREATININE, GLUCOSE in the last 72 hours. No results for input(s): BILITOT, ALKPHOS, AST, ALT in the last 72 hours. Lab Results   Component Value Date    LABAMPH POSITIVE 09/02/2021    BARBSCNU NOT DETECTED 09/02/2021    LABBENZ NOT DETECTED 09/02/2021    LABMETH NOT DETECTED 09/02/2021    OPIATESCREENURINE NOT DETECTED 09/02/2021    PHENCYCLIDINESCREENURINE NOT DETECTED 09/02/2021    ETOH <10 09/02/2021     No results found for: TSH, FREET4  No results found for: LITHIUM  No results found for: VALPROATE, CBMZ        Treatment Plan:  The patient's diagnosis, treatment plan, medication management were formulated after patient was seen directly by the attending physician and myself and all relevant documentation was reviewed. The patient was referred to outpatient/inpatient substance abuse rehabilitation programming. He was educated multiple times during the hospitalization that if he chooses to continue to use drugs or alcohol, he may potentially act out impulsively, resulting in serious harm to self or others, even though unintentional.  He was also educated that mental health treatment cannot be optimized with ongoing use of drugs. He demonstrated understanding has the capacity to understand that. Reviewed current Medications with the patient. Risk, benefit, side effects, possible outcomes of the medication and alternatives discussed with the patient and the patient demonstrated understanding.   The patient was also educated that the outcome of treatment will depend on the medication compliance as directed by the prescribers along with regular follow-up, compliance with the labs and other work-up, as clinically indicated. Collateral information: Followed by social work  CD evaluation  Encourage patient to attend group and other milieu activities. Discharge planning discussed with the patient and treatment team.    Increase Zyprexa to 15 mg at bedtime    PSYCHOTHERAPY/COUNSELING:  [x] Therapeutic interview  [x] Supportive  [] CBT  [] Ongoing  [] Other    [x] Patient continues to need, on a daily basis, active treatment furnished directly by or requiring the supervision of inpatient psychiatric personnel      Anticipated Length of stay: 5 to 7 days based on stability          NOTE: This report was transcribed using voice recognition software. Every effort was made to ensure accuracy; however, inadvertent computerized transcription errors may be present.   Electronically signed by JUANA Torres CNP on 2/9/1108 at 9:27 AM

## 2021-09-07 NOTE — CARE COORDINATION
Ladi contacted [employer]. Pt is able to return at time of discharge and they can provide transportation if needed. Ladi informed pt of this information. Pt signed JOSH for his friend Hanna . Ladi spoke wtih Hanna. She has no concerns for pt since he is able to go back to [place]. Hanna reports pt does not have access to any weapons in the community. She has not spoken with pt since he has been here, she tried to come see him in person but was informed that pt cannot have visitors due to [place]. She expressed no other concerns and only question was when pt would be discharged. Offered to assist as needed.

## 2021-09-08 VITALS
BODY MASS INDEX: 31.19 KG/M2 | RESPIRATION RATE: 16 BRPM | OXYGEN SATURATION: 99 % | DIASTOLIC BLOOD PRESSURE: 65 MMHG | SYSTOLIC BLOOD PRESSURE: 112 MMHG | TEMPERATURE: 98.2 F | HEIGHT: 67 IN | WEIGHT: 198.7 LBS | HEART RATE: 63 BPM

## 2021-09-08 PROCEDURE — 99239 HOSP IP/OBS DSCHRG MGMT >30: CPT | Performed by: NURSE PRACTITIONER

## 2021-09-08 PROCEDURE — 6360000002 HC RX W HCPCS: Performed by: NURSE PRACTITIONER

## 2021-09-08 PROCEDURE — 94640 AIRWAY INHALATION TREATMENT: CPT

## 2021-09-08 PROCEDURE — 6370000000 HC RX 637 (ALT 250 FOR IP): Performed by: STUDENT IN AN ORGANIZED HEALTH CARE EDUCATION/TRAINING PROGRAM

## 2021-09-08 RX ORDER — NICOTINE 21 MG/24HR
1 PATCH, TRANSDERMAL 24 HOURS TRANSDERMAL DAILY
Qty: 30 PATCH | Refills: 3
Start: 2021-09-09 | End: 2021-12-22 | Stop reason: SDUPTHER

## 2021-09-08 RX ORDER — OLANZAPINE 15 MG/1
15 TABLET ORAL NIGHTLY
Qty: 30 TABLET | Refills: 0 | Status: SHIPPED | OUTPATIENT
Start: 2021-09-08 | End: 2021-12-22

## 2021-09-08 RX ADMIN — NYSTATIN 500000 UNITS: 100000 SUSPENSION ORAL at 08:53

## 2021-09-08 RX ADMIN — BUDESONIDE 250 MCG: 0.25 SUSPENSION RESPIRATORY (INHALATION) at 11:00

## 2021-09-08 RX ADMIN — ALBUTEROL SULFATE 2.5 MG: 2.5 SOLUTION RESPIRATORY (INHALATION) at 11:00

## 2021-09-08 ASSESSMENT — PAIN SCALES - GENERAL: PAINLEVEL_OUTOF10: 0

## 2021-09-08 NOTE — BH NOTE
[id number] [facility] Interdisciplinary Treatment Plan Note     Review Date & Time: 9/8/2021 1000    Patient was in treatment team.    Estimated Length of Stay Update:  Discharge today   Estimated Discharge Date Update: 9/8/2021    EDUCATION:   Learner Progress Toward Treatment Goals: Reviewed group plan and strategies    Method: Small group    Outcome: Verbalized understanding    PATIENT GOALS: attend more meetings    PLAN/TREATMENT RECOMMENDATIONS UPDATE: medication compliance and group therapy attendance    GOALS UPDATE:  Time frame for Short-Term Goals: discharge today      Bettina Forrest RN

## 2021-09-08 NOTE — CARE COORDINATION
In order to ensure appropriate transition and discharge planning is in place, the following documents have been transmitted to [facility], as the new outpatient provider:     The d/c diagnosis was transmitted to the next care provider   The reason for hospitalization was transmitted to the next care provider   The d/c medications (dosage and indication) were transmitted to the next care provider    The continuing care plan was transmitted to the next care provider

## 2021-09-08 NOTE — PROGRESS NOTES
Attended community meeting. Updated on staffing and daily expectations. Shared goal for the day as to attend more meetings.

## 2021-09-08 NOTE — SUICIDE SAFETY PLAN
SAFETY PLAN    A suicide Safety Plan is a document that supports someone when they are having thoughts of suicide. Warning Signs that indicate a suicidal crisis may be developing: What (situations, thoughts, feelings, body sensations, behaviors, etc.) do you experience that lets you know you are beginning to think about suicide? 1. Doing meth    Internal Coping Strategies:  What things can I do (relaxation techniques, hobbies, physical activities, etc.) to take my mind off my problems without contacting another person? 1. Work out  2. Go to meetings  3. Doing IOP    People and social settings that provide distraction: Who can I call or where can I go to distract me? 1. Name: Deep Santos  Phone: [phone]  2. Name: Russ Yadav mother  Phone: [phone]   3. Place: [address] [place] [address] whom I can ask for help: Who can I call when I need help - for example, friends, family, clergy, someone else? 1. Name: Deep Santos                Phone: [phone]  2. Name: Russ Yadav  Phone: [phone]       Professionals or [facility] agencies I can contact during a crisis: Who can I call for help - for example, my doctor, my psychiatrist, my psychologist, a mental health provider, a suicide hotline? 1. Clinician Name: 80   Phone: 911      Clinician Pager or Emergency Contact #: [phone]    6. Clinician Name: 80   Phone: 911      Clinician Pager or Emergency Contact #: [phone]    9. Suicide Prevention Lifeline: 8-510-256-TALK (6031)    4. [address] [place] [address] Emergency Services -  for example, [facility] suicide hotline, [facility] Hotline: 211      Emergency Services Address: [address]      Emergency Services Phone: [phone]    Making the environment safe: How can I make my environment (house/apartment/living space) safer? For example, can I remove guns, medications, and other items? 1. No weapons  2.  No guns

## 2021-09-08 NOTE — CARE COORDINATION
Transportation scheduled [facility]. ETA is 1-2 hours, RN aware. Sw waiting for Ny Rojas to call back with pt appointments.

## 2021-09-08 NOTE — GROUP NOTE
Date: 9/8/2021    Group Start Time: 1000  Group End Time: 8829  Group Topic: Psychoeducation    [facility] 1    Miguel Bae                                                                        Group Therapy Note    Date: 9/8/2021  Type of Group: Psychoeducation    Wellness Binder Information  Module Name:  healthy vs unhealthy relationships     Patient's Goal:  patient will be able to aspects of healthy relationships    Notes:  pleasant and sharing in group, willing to share when prompted. Status After Intervention:  Improved    Participation Level:  Active Listener and Interactive    Participation Quality: Appropriate, Attentive, Sharing, and Supportive      Speech: normal     Thought Process/Content: Logical      Affective Functioning: Congruent      Mood: euthymic      Level of consciousness:  Alert, Oriented x4, and Attentive      Response to Learning: Able to verbalize current knowledge/experience, Able to verbalize/acknowledge new learning, and Progressing to goal      Endings: None Reported    Modes of Intervention: Education, Support, Socialization, Exploration, and Problem-solving      Discipline Responsible: Psychoeducational Specialist      Signature:  Renae Enriquez
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
## 2021-09-08 NOTE — BH NOTE
Follow up appt made for patient for follow up on Friday Sept 17,2021 at 10:40 a.m.  [address] [facility] PCP [address] [place] [address]. [phone]

## 2021-09-08 NOTE — PLAN OF CARE
Problem: Altered Mood, Psychotic Behavior:  Goal: Able to demonstrate trust by eating, participating in treatment and following staff's direction  Description: Able to demonstrate trust by eating, participating in treatment and following staff's direction  9/8/2021 1059 by Laxmi Porras RN  Outcome: Completed  9/8/2021 0952 by Laxmi Porras RN  Outcome: Ongoing  Goal: Able to verbalize decrease in frequency and intensity of hallucinations  Description: Able to verbalize decrease in frequency and intensity of hallucinations  9/8/2021 1059 by Laxmi Porras RN  Outcome: Completed  9/8/2021 0952 by Laxmi Porras RN  Outcome: Ongoing  Goal: Able to verbalize reality based thinking  Description: Able to verbalize reality based thinking  9/8/2021 1059 by Laxmi Porras RN  Outcome: Completed  9/8/2021 0952 by Laxmi Porras RN  Outcome: Ongoing  Goal: Absence of self-harm  Description: Absence of self-harm  9/8/2021 1059 by Laxmi Porras RN  Outcome: Completed  9/8/2021 0952 by Laxmi Porras RN  Outcome: Ongoing     Problem: Substance Abuse:  Goal: Absence of drug withdrawal signs and symptoms  Description: Absence of drug withdrawal signs and symptoms  9/8/2021 1059 by Laxmi Porras RN  Outcome: Completed  9/8/2021 0952 by Laxmi Porras RN  Outcome: Ongoing

## 2021-09-15 ENCOUNTER — HOSPITAL ENCOUNTER (OUTPATIENT)
Dept: PSYCHIATRY | Age: 41
Setting detail: THERAPIES SERIES
Discharge: HOME | End: 2021-09-15
Payer: COMMERCIAL

## 2021-09-15 PROCEDURE — H0001 ALCOHOL AND/OR DRUG ASSESS: HCPCS

## 2021-09-15 ASSESSMENT — LIFESTYLE VARIABLES: HISTORY_ALCOHOL_USE: NO

## 2021-09-15 ASSESSMENT — ANXIETY QUESTIONNAIRES
7. FEELING AFRAID AS IF SOMETHING AWFUL MIGHT HAPPEN: 2
2. NOT BEING ABLE TO STOP OR CONTROL WORRYING: 1
3. WORRYING TOO MUCH ABOUT DIFFERENT THINGS: 2
GAD7 TOTAL SCORE: 12
4. TROUBLE RELAXING: 2
5. BEING SO RESTLESS THAT IT IS HARD TO SIT STILL: 1
1. FEELING NERVOUS, ANXIOUS, OR ON EDGE: 1
6. BECOMING EASILY ANNOYED OR IRRITABLE: 3

## 2021-09-15 ASSESSMENT — PATIENT HEALTH QUESTIONNAIRE - PHQ9: SUM OF ALL RESPONSES TO PHQ QUESTIONS 1-9: 12
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
## 2021-09-17 ENCOUNTER — HOSPITAL ENCOUNTER (OUTPATIENT)
Age: 41
Discharge: HOME | End: 2021-09-17
Payer: COMMERCIAL

## 2021-09-17 ENCOUNTER — OFFICE VISIT (OUTPATIENT)
Dept: FAMILY MEDICINE CLINIC | Age: 41
End: 2021-09-17
Payer: COMMERCIAL

## 2021-09-17 VITALS
TEMPERATURE: 98.2 F | WEIGHT: 206 LBS | DIASTOLIC BLOOD PRESSURE: 85 MMHG | SYSTOLIC BLOOD PRESSURE: 115 MMHG | BODY MASS INDEX: 32.33 KG/M2 | HEIGHT: 67 IN | OXYGEN SATURATION: 96 % | HEART RATE: 96 BPM

## 2021-09-17 DIAGNOSIS — J45.40 MODERATE PERSISTENT ASTHMA WITHOUT COMPLICATION: Primary | ICD-10-CM

## 2021-09-17 DIAGNOSIS — Z21 ASYMPTOMATIC HIV INFECTION, WITH NO HISTORY OF HIV-RELATED ILLNESS (HCC): ICD-10-CM

## 2021-09-17 DIAGNOSIS — Z11.3 SCREEN FOR SEXUALLY TRANSMITTED DISEASES: ICD-10-CM

## 2021-09-17 DIAGNOSIS — B35.6 TINEA CRURIS: ICD-10-CM

## 2021-09-17 DIAGNOSIS — J45.40 MODERATE PERSISTENT ASTHMA, UNSPECIFIED WHETHER COMPLICATED: ICD-10-CM

## 2021-09-17 PROBLEM — Z76.89 ENCOUNTER TO ESTABLISH CARE: Status: ACTIVE | Noted: 2021-09-17

## 2021-09-17 PROCEDURE — 1111F DSCHRG MED/CURRENT MED MERGE: CPT | Performed by: FAMILY MEDICINE

## 2021-09-17 PROCEDURE — 4004F PT TOBACCO SCREEN RCVD TLK: CPT | Performed by: FAMILY MEDICINE

## 2021-09-17 PROCEDURE — 80074 ACUTE HEPATITIS PANEL: CPT

## 2021-09-17 PROCEDURE — 87491 CHLMYD TRACH DNA AMP PROBE: CPT

## 2021-09-17 PROCEDURE — 99202 OFFICE O/P NEW SF 15 MIN: CPT | Performed by: FAMILY MEDICINE

## 2021-09-17 PROCEDURE — 86592 SYPHILIS TEST NON-TREP QUAL: CPT

## 2021-09-17 PROCEDURE — 36415 COLL VENOUS BLD VENIPUNCTURE: CPT

## 2021-09-17 PROCEDURE — 87591 N.GONORRHOEAE DNA AMP PROB: CPT

## 2021-09-17 PROCEDURE — G8427 DOCREV CUR MEDS BY ELIG CLIN: HCPCS | Performed by: FAMILY MEDICINE

## 2021-09-17 PROCEDURE — 99213 OFFICE O/P EST LOW 20 MIN: CPT | Performed by: FAMILY MEDICINE

## 2021-09-17 PROCEDURE — G8417 CALC BMI ABV UP PARAM F/U: HCPCS | Performed by: FAMILY MEDICINE

## 2021-09-17 PROCEDURE — 87536 HIV-1 QUANT&REVRSE TRNSCRPJ: CPT

## 2021-09-17 RX ORDER — BICTEGRAVIR SODIUM, EMTRICITABINE, AND TENOFOVIR ALAFENAMIDE FUMARATE 50; 200; 25 MG/1; MG/1; MG/1
1 TABLET ORAL NIGHTLY
Qty: 30 TABLET | Refills: 2 | Status: SHIPPED
Start: 2021-09-17 | End: 2021-12-22 | Stop reason: SDUPTHER

## 2021-09-17 RX ORDER — BUDESONIDE AND FORMOTEROL FUMARATE DIHYDRATE 160; 4.5 UG/1; UG/1
2 AEROSOL RESPIRATORY (INHALATION) 2 TIMES DAILY
Qty: 30.6 G | Refills: 1 | Status: SHIPPED
Start: 2021-09-17 | End: 2021-12-22 | Stop reason: SDUPTHER

## 2021-09-17 RX ORDER — CALORIC SUPPLEMENT
1 LIQUID (ML) ORAL 3 TIMES DAILY
Qty: 100 EACH | Refills: 2 | Status: SHIPPED
Start: 2021-09-17 | End: 2021-11-05 | Stop reason: SDUPTHER

## 2021-09-17 RX ORDER — PETROLATUM 42 G/100G
OINTMENT TOPICAL
Qty: 228 G | Refills: 0 | Status: SHIPPED
Start: 2021-09-17 | End: 2021-12-22 | Stop reason: SDUPTHER

## 2021-09-17 RX ORDER — NYSTATIN 100000 U/G
CREAM TOPICAL
Qty: 30 G | Refills: 0 | Status: SHIPPED
Start: 2021-09-17 | End: 2021-12-22 | Stop reason: SDUPTHER

## 2021-09-17 SDOH — ECONOMIC STABILITY: FOOD INSECURITY: WITHIN THE PAST 12 MONTHS, THE FOOD YOU BOUGHT JUST DIDN'T LAST AND YOU DIDN'T HAVE MONEY TO GET MORE.: NEVER TRUE

## 2021-09-17 SDOH — ECONOMIC STABILITY: FOOD INSECURITY: WITHIN THE PAST 12 MONTHS, YOU WORRIED THAT YOUR FOOD WOULD RUN OUT BEFORE YOU GOT MONEY TO BUY MORE.: NEVER TRUE

## 2021-09-17 ASSESSMENT — SOCIAL DETERMINANTS OF HEALTH (SDOH): HOW HARD IS IT FOR YOU TO PAY FOR THE VERY BASICS LIKE FOOD, HOUSING, MEDICAL CARE, AND HEATING?: NOT HARD AT ALL

## 2021-09-17 ASSESSMENT — LIFESTYLE VARIABLES: HOW OFTEN DO YOU HAVE A DRINK CONTAINING ALCOHOL: NEVER

## 2021-09-17 NOTE — PROGRESS NOTES
[facility]  FAMILY MEDICINE RESIDENCY PROGRAM  DATE OF VISIT : 2021    Patient : Winston Reinoso   Age : 36 y.o.  : 1980   MRN : <[id number]>   ______________________________________________________________________    Chief Complaint :   Chief Complaint   Patient presents with    New Patient    Other     abdominal itching    Pharyngitis       HPI : Winston Reinoso is 36 y.o. male who presented to the clinic today for establishment of care. Asthma:   Current medications include Qvar and albuterol as needed. Reports almost daily use of albuterol. Does have nighttime symptoms as well. Reports compliance with Qvar. Not under the care of pulmonology. HIV:   Well-controlled on Biktarvy, states that viral load is consistently nondetected. No missed doses, no side effects. Last CD4 count checked in hospitalization was above 1000. Was sexually active 2 to 3 weeks ago when patient relapsed, requesting STI testing at this time. Reporting some itching, skin irritation in the genital region over the last few weeks. Does have history of psoriasis. Has not been using anything for this. Other past medical, surgical, family medical, social history documented in chart. Does have history of substance use, methamphetamine use, opiate use, last used 2 to 3 weeks ago when patient relapsed. Currently living in sober living.           Past Medical History :  Past Medical History:   Diagnosis Date    Asthma     GERD (gastroesophageal reflux disease)     HIV (human immunodeficiency virus infection) ([facility] [address]     Psoriasis      Past Surgical History:   Procedure Laterality Date    OTHER SURGICAL HISTORY      patient states he went to surgery to have a boil removed          Review of Systems :    ROS - Per HPI   ______________________________________________________________________    Physical Exam :    Wt Readings from Last 3 Encounters:   21 206 lb (93.4 kg)   21 200 lb (90.7 kg)       BMI Readings from Last 3 Encounters:   09/17/21 32.26 kg/m²   09/01/21 31.32 kg/m²   ]      Vitals: /85 (Site: Right Upper Arm, Position: Sitting, Cuff Size: Large Adult)   Pulse 96   Temp 98.2 °F (36.8 °C) (Temporal)   Ht 5' 7\" (1.702 m)   Wt 206 lb (93.4 kg)   SpO2 96%   BMI 32.26 kg/m²   GENERAL: Alert, cooperative, no acute distress. CHEST: No tenderness or deformity, full & symmetric excursion  LUNG: Clear to auscultation bilaterally,  respirations unlabored. No rales/wheezing/rubs  HEART: RRR, S1 and S2 normal, no murmur, rub or gallop. DP pulses 2/4  ABDOMEN: SNTND, no masses, no organomegaly, no guarding, rebound or rigidity. EXTREMITIES:  Extremities normal, atraumatic, no cyanosis or edema. Distal pulses equal bilaterally        : April N, [place], chaperone present. Skin of inner thigh and groin region red, flaking, some satellite lesions noted  ______________________________________________________________________    Assessment & Plan :     Diagnosis Orders   1. Moderate persistent asthma without complication  budesonide-formoterol (SYMBICORT) 160-4.5 MCG/ACT AERO   2. Asymptomatic HIV infection, with no history of HIV-related illness (HCC)  bictegravir-emtricitab-tenofovir alafenamide (BIKTARVY) -25 MG TABS per tablet   3. Tinea cruris  mineral oil-hydrophilic petrolatum (HYDROPHOR) ointment    nystatin (MYCOSTATIN) 511170 UNIT/GM cream   4. Screen for sexually transmitted diseases  HEPATITIS PANEL, ACUTE    RPR    Culture, Gonococcus       Asthma: Poorly controlled based on symptoms described, will switch to combination ICS/LABA inhaler. Follow symptoms closely. Consider PFTs.   HIV: Well-controlled per patient, refill Biktarvy, check viral load  Request for STI testing:  check GC chlamydia, RPR, hepatitis panel  Skin irritation likely tinea cruris: We'll trial topical nystatin, follow symptoms closely      Follow up:  Return in about 2 weeks (around 10/1/2021) for review test results, follow up on symptoms.       Sharon Roberts MD PGY-3    Discussed with: Dr. Annette Mendez

## 2021-09-17 NOTE — CARE COORDINATION
Message was left on client's phone to call therapist.      Electronically signed by [facility] on 9/17/2021 at 5:19 PM

## 2021-09-17 NOTE — PROGRESS NOTES
80-year-old male with history of HIV, asthma, substance use here to establish care. Asthma: Current medications include Qvar and albuterol as needed. Reports almost daily use of albuterol. Does have nighttime symptoms as well. Reports compliance with Qvar. Not under the care of pulmonology. HIV: Well-controlled on Biktarvy, states that viral load is consistently nondetected. No missed doses, no side effects. Last CD4 count checked in hospitalization was above 1000. Was sexually active 2 to 3 weeks ago when patient relapsed, requesting STI testing at this time. Reporting some itching, skin irritation in the genital region over the last few weeks. Does have history of psoriasis. Has not been using anything for this. Other past medical, surgical, family medical, social history documented in chart. Does have history of substance use, methamphetamine use, opiate use, last used 2 to 3 weeks ago when patient relapsed. Currently living in sober living. Blood pressure 115/85, pulse 96, temperature 98.2 °F (36.8 °C), temperature source Temporal, height 5' 7\" (1.702 m), weight 206 lb (93.4 kg), SpO2 96 %. HEENT WNL     Heart regular    Lungs clear    abd non-tender      No edema    Pulses intact   : Skin of inner thigh and groin region red, flaking, some satellite lesions noted    Assessment and plan  Asthma: Poorly controlled based on symptoms described, will switch to combination ICS/LABA inhaler. Follow symptoms closely. Consider PFTs. HIV: Well-controlled per patient, refill Biktarvy, check viral load  Request for STI testing: We'll check GC chlamydia, RPR, hepatitis panel  Skin irritation likely secondary to Candida: We'll trial topical nystatin, follow symptoms closely    Return to clinic in 2 weeks to reassess response to nystatin and review test results    Attending Physician Statement  I have discussed the case, including pertinent history and exam findings with the resident.  I agree with the documented assessment and plan. Patient/Caregiver provided printed discharge information.

## 2021-09-20 LAB
GENITAL CULTURE, ROUTINE: ABNORMAL
GENITAL CULTURE, ROUTINE: ABNORMAL
HAV IGM SER IA-ACNC: NORMAL
HEPATITIS B CORE IGM ANTIBODY: NORMAL
HEPATITIS B SURFACE ANTIGEN INTERPRETATION: NORMAL
HEPATITIS C ANTIBODY INTERPRETATION: NORMAL
ORGANISM: ABNORMAL
RPR: NORMAL

## 2021-09-21 ENCOUNTER — TELEPHONE (OUTPATIENT)
Dept: FAMILY MEDICINE CLINIC | Age: 41
End: 2021-09-21

## 2021-09-21 NOTE — CARE COORDINATION
Counselor left a message on mobile phone number listed in patient's chart asking him to call to confirm IOP start date.       Electronically signed by Lucius Anne on 9/21/2021 at 1:03 PM

## 2021-09-21 NOTE — TELEPHONE ENCOUNTER
[address] is unable to fill the [address]   They have been unable to reach the patient to inform him of that so they can send to a different pharmacy

## 2021-09-22 LAB
C. TRACHOMATIS DNA ,URINE: NEGATIVE
DIRECT EXAM: NORMAL
N. GONORRHOEAE DNA, URINE: NEGATIVE
SOURCE: NORMAL
SPECIMEN: NORMAL

## 2021-09-27 ENCOUNTER — HOSPITAL ENCOUNTER (OUTPATIENT)
Dept: PSYCHIATRY | Age: 41
Setting detail: THERAPIES SERIES
End: 2021-09-27
Payer: COMMERCIAL

## 2021-10-25 ENCOUNTER — HOSPITAL ENCOUNTER (EMERGENCY)
Age: 41
Discharge: HOME | End: 2021-10-25
Attending: EMERGENCY MEDICINE
Payer: COMMERCIAL

## 2021-10-25 ENCOUNTER — APPOINTMENT (OUTPATIENT)
Dept: CT IMAGING | Age: 41
End: 2021-10-25
Payer: COMMERCIAL

## 2021-10-25 VITALS
DIASTOLIC BLOOD PRESSURE: 72 MMHG | OXYGEN SATURATION: 98 % | HEART RATE: 78 BPM | BODY MASS INDEX: 34.46 KG/M2 | TEMPERATURE: 97.1 F | WEIGHT: 220 LBS | SYSTOLIC BLOOD PRESSURE: 134 MMHG

## 2021-10-25 DIAGNOSIS — J44.1 COPD EXACERBATION (HCC): Primary | ICD-10-CM

## 2021-10-25 DIAGNOSIS — D18.03 LIVER HEMANGIOMA: ICD-10-CM

## 2021-10-25 LAB
ALBUMIN SERPL-MCNC: 4.4 G/DL (ref 3.5–5.2)
ALP BLD-CCNC: 70 U/L (ref 40–129)
ALT SERPL-CCNC: 31 U/L (ref 0–40)
ANION GAP SERPL CALCULATED.3IONS-SCNC: 8 MMOL/L (ref 7–16)
AST SERPL-CCNC: 22 U/L (ref 0–39)
BASOPHILS ABSOLUTE: 0.02 E9/L (ref 0–0.2)
BASOPHILS RELATIVE PERCENT: 0.3 % (ref 0–2)
BILIRUB SERPL-MCNC: 0.2 MG/DL (ref 0–1.2)
BUN BLDV-MCNC: 13 MG/DL (ref 6–20)
CALCIUM SERPL-MCNC: 9.7 MG/DL (ref 8.6–10.2)
CHLORIDE BLD-SCNC: 107 MMOL/L (ref 98–107)
CO2: 25 MMOL/L (ref 22–29)
CREAT SERPL-MCNC: 1.3 MG/DL (ref 0.7–1.2)
EKG ATRIAL RATE: 82 BPM
EKG P AXIS: 67 DEGREES
EKG P-R INTERVAL: 154 MS
EKG Q-T INTERVAL: 352 MS
EKG QRS DURATION: 70 MS
EKG QTC CALCULATION (BAZETT): 411 MS
EKG R AXIS: 76 DEGREES
EKG T AXIS: 67 DEGREES
EKG VENTRICULAR RATE: 82 BPM
EOSINOPHILS ABSOLUTE: 0.14 E9/L (ref 0.05–0.5)
EOSINOPHILS RELATIVE PERCENT: 2.1 % (ref 0–6)
GFR AFRICAN AMERICAN: >60
GFR NON-AFRICAN AMERICAN: >60 ML/MIN/1.73
GLUCOSE BLD-MCNC: 91 MG/DL (ref 74–99)
HCT VFR BLD CALC: 41.2 % (ref 37–54)
HEMOGLOBIN: 13.3 G/DL (ref 12.5–16.5)
IMMATURE GRANULOCYTES #: 0.03 E9/L
IMMATURE GRANULOCYTES %: 0.4 % (ref 0–5)
LYMPHOCYTES ABSOLUTE: 2.64 E9/L (ref 1.5–4)
LYMPHOCYTES RELATIVE PERCENT: 39.3 % (ref 20–42)
MCH RBC QN AUTO: 30.6 PG (ref 26–35)
MCHC RBC AUTO-ENTMCNC: 32.3 % (ref 32–34.5)
MCV RBC AUTO: 94.9 FL (ref 80–99.9)
MONOCYTES ABSOLUTE: 0.87 E9/L (ref 0.1–0.95)
MONOCYTES RELATIVE PERCENT: 12.9 % (ref 2–12)
NEUTROPHILS ABSOLUTE: 3.02 E9/L (ref 1.8–7.3)
NEUTROPHILS RELATIVE PERCENT: 45 % (ref 43–80)
PDW BLD-RTO: 16.2 FL (ref 11.5–15)
PLATELET # BLD: 342 E9/L (ref 130–450)
PMV BLD AUTO: 8.7 FL (ref 7–12)
POTASSIUM SERPL-SCNC: 4.5 MMOL/L (ref 3.5–5)
PRO-BNP: 85 PG/ML (ref 0–125)
RBC # BLD: 4.34 E12/L (ref 3.8–5.8)
SODIUM BLD-SCNC: 140 MMOL/L (ref 132–146)
TOTAL PROTEIN: 7.5 G/DL (ref 6.4–8.3)
TROPONIN, HIGH SENSITIVITY: 6 NG/L (ref 0–11)
WBC # BLD: 6.7 E9/L (ref 4.5–11.5)

## 2021-10-25 PROCEDURE — 83880 ASSAY OF NATRIURETIC PEPTIDE: CPT

## 2021-10-25 PROCEDURE — 80053 COMPREHEN METABOLIC PANEL: CPT

## 2021-10-25 PROCEDURE — 6360000004 HC RX CONTRAST MEDICATION: Performed by: RADIOLOGY

## 2021-10-25 PROCEDURE — 71275 CT ANGIOGRAPHY CHEST: CPT

## 2021-10-25 PROCEDURE — 93005 ELECTROCARDIOGRAM TRACING: CPT | Performed by: PHYSICIAN ASSISTANT

## 2021-10-25 PROCEDURE — 6370000000 HC RX 637 (ALT 250 FOR IP): Performed by: EMERGENCY MEDICINE

## 2021-10-25 PROCEDURE — 85025 COMPLETE CBC W/AUTO DIFF WBC: CPT

## 2021-10-25 PROCEDURE — 94640 AIRWAY INHALATION TREATMENT: CPT

## 2021-10-25 PROCEDURE — 99283 EMERGENCY DEPT VISIT LOW MDM: CPT

## 2021-10-25 PROCEDURE — 96374 THER/PROPH/DIAG INJ IV PUSH: CPT

## 2021-10-25 PROCEDURE — 6360000002 HC RX W HCPCS: Performed by: EMERGENCY MEDICINE

## 2021-10-25 PROCEDURE — 84484 ASSAY OF TROPONIN QUANT: CPT

## 2021-10-25 RX ORDER — AZITHROMYCIN 250 MG/1
TABLET, FILM COATED ORAL
Qty: 1 PACKET | Refills: 0 | Status: SHIPPED | OUTPATIENT
Start: 2021-10-25 | End: 2021-11-04

## 2021-10-25 RX ORDER — DEXAMETHASONE SODIUM PHOSPHATE 10 MG/ML
10 INJECTION INTRAMUSCULAR; INTRAVENOUS ONCE
Status: COMPLETED | OUTPATIENT
Start: 2021-10-25 | End: 2021-10-25

## 2021-10-25 RX ORDER — IPRATROPIUM BROMIDE AND ALBUTEROL SULFATE 2.5; .5 MG/3ML; MG/3ML
1 SOLUTION RESPIRATORY (INHALATION) CONTINUOUS
Status: DISCONTINUED | OUTPATIENT
Start: 2021-10-25 | End: 2021-10-25 | Stop reason: HOSPADM

## 2021-10-25 RX ORDER — PREDNISONE 50 MG/1
50 TABLET ORAL DAILY
Qty: 4 TABLET | Refills: 0 | Status: SHIPPED | OUTPATIENT
Start: 2021-10-25 | End: 2021-10-29

## 2021-10-25 RX ADMIN — IOPAMIDOL 75 ML: 755 INJECTION, SOLUTION INTRAVENOUS at 16:46

## 2021-10-25 RX ADMIN — IPRATROPIUM BROMIDE AND ALBUTEROL SULFATE 1 AMPULE: .5; 2.5 SOLUTION RESPIRATORY (INHALATION) at 14:43

## 2021-10-25 RX ADMIN — DEXAMETHASONE SODIUM PHOSPHATE 10 MG: 10 INJECTION INTRAMUSCULAR; INTRAVENOUS at 14:57

## 2021-10-25 ASSESSMENT — ENCOUNTER SYMPTOMS
WHEEZING: 1
SORE THROAT: 1
VOMITING: 0
BLOOD IN STOOL: 0
CONSTIPATION: 0
CHEST TIGHTNESS: 1
NAUSEA: 0
HEMOPTYSIS: 0
COUGH: 1
SHORTNESS OF BREATH: 1
BACK PAIN: 0
ABDOMINAL PAIN: 0
DIARRHEA: 0
SPUTUM PRODUCTION: 1

## 2021-10-25 NOTE — ED PROVIDER NOTES
reactive to light. Cardiovascular:      Rate and Rhythm: Normal rate and regular rhythm. Heart sounds: Normal heart sounds. No murmur heard. Pulmonary:      Effort: Pulmonary effort is normal. Tachypnea present. No respiratory distress. Breath sounds: Wheezing present. No rales. Chest:      Chest wall: No tenderness. Abdominal:      General: Bowel sounds are normal.      Palpations: Abdomen is soft. Tenderness: There is no abdominal tenderness. There is no guarding or rebound. Musculoskeletal:         General: Normal range of motion. Cervical back: Normal range of motion and neck supple. Right lower leg: No tenderness. No edema. Left lower leg: No tenderness. No edema. Skin:     General: Skin is warm and dry. Capillary Refill: Capillary refill takes less than 2 seconds. Coloration: Skin is not pale. Neurological:      General: No focal deficit present. Mental Status: He is alert and oriented to person, place, and time. Cranial Nerves: No cranial nerve deficit. Coordination: Coordination normal.   Psychiatric:         Mood and Affect: Mood normal. Mood is not anxious. Behavior: Behavior normal.         Procedures    MDM    ED Course as of Oct 25 1737   Mon Oct 25, 2021   1734 5:34 PM EDT  I received this patient at sign out from Dr. Elyssa Ch. I have discussed the patient's initial exam, treatment and plan of care with the out going physician. I have introduced my self to the patient / family and have answered their questions to this point. I have examined the patient myself and reviewed ordered tests / medications and  reviewed any available results to this point. If a resident is involved in the Emergency Department care, I have discussed my findings and plan with them as well. Patient sitting up in a chair resting comfortably no distress. Normal pulse ox. No respiratory distress. Scattered expiratory wheezing.   States he has inhalers as well as nebulizer machine and medications at home. States he has had a cough and congestion for the last 3 to 4 weeks. Close outpatient follow-up as well as treatment with steroids and antibiotics are discussed with him, CT results are discussed as well as incidental liver findings and need for close outpatient follow-up, he verbalizes understanding. [NC]      ED Course User Index  [NC] Alivia Turner DO    EKG Interpretation    Interpreted by emergency department physician    Rhythm: normal sinus   Rate: normal  Axis: normal  Ectopy: none  Conduction: normal  ST Segments: normal  T Waves: normal  Q Waves: none    Clinical Impression: no acute changes    Sonia Partida DO  5:03 PM EDT  I have signed this patient out to the oncoming physician, Dr. Rossy Eubanks.  I have discussed the patient's initial exam, treatment and plan of care with the on coming physician. I have notified the patient / family of the change in treating physician and answered their questions to this point. ED Course as of Oct 25 1737   Mon Oct 25, 2021   1734 5:34 PM EDT  I received this patient at sign out from Dr. Melida Toney. I have discussed the patient's initial exam, treatment and plan of care with the out going physician. I have introduced my self to the patient / family and have answered their questions to this point. I have examined the patient myself and reviewed ordered tests / medications and  reviewed any available results to this point. If a resident is involved in the Emergency Department care, I have discussed my findings and plan with them as well. Patient sitting up in a chair resting comfortably no distress. Normal pulse ox. No respiratory distress. Scattered expiratory wheezing. States he has inhalers as well as nebulizer machine and medications at home. States he has had a cough and congestion for the last 3 to 4 weeks.   Close outpatient follow-up as well as treatment with steroids and antibiotics are discussed with him, CT results are discussed as well as incidental liver findings and need for close outpatient follow-up, he verbalizes understanding. [NC]      ED Course User Index  [NC] Danika Grossman, DO       --------------------------------------------- PAST HISTORY ---------------------------------------------  Past Medical History:  has a past medical history of Asthma, GERD (gastroesophageal reflux disease), HIV (human immunodeficiency virus infection) ([facility] [address] and Psoriasis. Past Surgical History:  has a past surgical history that includes other surgical history (2007). Social History:  reports that he has been smoking cigarettes. He has been smoking about 1.00 pack per day. He has never used smokeless tobacco. He reports previous alcohol use. He reports current drug use. Drugs: Methamphetamines, Other-see comments, and Marijuana. Family History: family history includes Arthritis in his brother and mother; Other in his mother; Thyroid Disease in his mother. The patients home medications have been reviewed. Allergies: Patient has no known allergies.     -------------------------------------------------- RESULTS -------------------------------------------------  Labs:  Results for orders placed or performed during the hospital encounter of 10/25/21   CBC Auto Differential   Result Value Ref Range    WBC 6.7 4.5 - 11.5 E9/L    RBC 4.34 3.80 - 5.80 E12/L    Hemoglobin 13.3 12.5 - 16.5 g/dL    Hematocrit 41.2 37.0 - 54.0 %    MCV 94.9 80.0 - 99.9 fL    MCH 30.6 26.0 - 35.0 pg    MCHC 32.3 32.0 - 34.5 %    RDW 16.2 (H) 11.5 - 15.0 fL    Platelets 725 503 - 154 E9/L    MPV 8.7 7.0 - 12.0 fL    Neutrophils % 45.0 43.0 - 80.0 %    Immature Granulocytes % 0.4 0.0 - 5.0 %    Lymphocytes % 39.3 20.0 - 42.0 %    Monocytes % 12.9 (H) 2.0 - 12.0 %    Eosinophils % 2.1 0.0 - 6.0 %    Basophils % 0.3 0.0 - 2.0 %    Neutrophils Absolute 3.02 1.80 - 7.30 E9/L    Immature Granulocytes # 0.03 E9/L    Lymphocytes Absolute 2.64 1.50 - 4.00 E9/L    Monocytes Absolute 0.87 0.10 - 0.95 E9/L    Eosinophils Absolute 0.14 0.05 - 0.50 E9/L    Basophils Absolute 0.02 0.00 - 0.20 E9/L   Comprehensive Metabolic Panel   Result Value Ref Range    Sodium 140 132 - 146 mmol/L    Potassium 4.5 3.5 - 5.0 mmol/L    Chloride 107 98 - 107 mmol/L    CO2 25 22 - 29 mmol/L    Anion Gap 8 7 - 16 mmol/L    Glucose 91 74 - 99 mg/dL    BUN 13 6 - 20 mg/dL    CREATININE 1.3 (H) 0.7 - 1.2 mg/dL    GFR Non-African American >60 >=60 mL/min/1.73    GFR African American >60     Calcium 9.7 8.6 - 10.2 mg/dL    Total Protein 7.5 6.4 - 8.3 g/dL    Albumin 4.4 3.5 - 5.2 g/dL    Total Bilirubin 0.2 0.0 - 1.2 mg/dL    Alkaline Phosphatase 70 40 - 129 U/L    ALT 31 0 - 40 U/L    AST 22 0 - 39 U/L   Troponin   Result Value Ref Range    Troponin, High Sensitivity 6 0 - 11 ng/L   Brain Natriuretic Peptide   Result Value Ref Range    Pro-BNP 85 0 - 125 pg/mL   EKG 12 Lead   Result Value Ref Range    Ventricular Rate 82 BPM    Atrial Rate 82 BPM    P-R Interval 154 ms    QRS Duration 70 ms    Q-T Interval 352 ms    QTc Calculation (Bazett) 411 ms    P Axis 67 degrees    R Axis 76 degrees    T Axis 67 degrees       Radiology:  CTA CHEST W CONTRAST   Final Result   1. There is no evidence of a pulmonary embolus. 2. There are no findings of atypical or COVID pneumonia. 3. There is no bronchial wall thickening to suggest bronchiolitis/bronchitis. 4. Arterial blush within the left hepatic lobe laterally. This is favored to   represent a benign arteriovenous malformation. This finding cannot be   completely evaluated on the arterial phase PE study. Nonemergent follow-up   MRI of the liver with contrast is recommended following a hemangioma protocol.    5. 3 small benign hemangiomas seen within the liver, the largest largest is   seen within the right hepatic lobe and measures 1.5 cm.             ------------------------- NURSING NOTES AND VITALS REVIEWED ---------------------------  Date / Time Roomed:  10/25/2021  1:53 PM  ED Bed Assignment:  26/26    The nursing notes within the ED encounter and vital signs as below have been reviewed. /68   Pulse 90   Temp 97.1 °F (36.2 °C) (Tympanic)   Wt 220 lb (99.8 kg)   SpO2 97%   BMI 34.46 kg/m²   Oxygen Saturation Interpretation: Normal      ------------------------------------------ PROGRESS NOTES ------------------------------------------  I have spoken with the patient and discussed todays results, in addition to providing specific details for the plan of care and counseling regarding the diagnosis and prognosis. Their questions are answered at this time and they are agreeable with the plan. I discussed at length with them reasons for immediate return here for re evaluation. They will followup with primary care by calling their office tomorrow. --------------------------------- ADDITIONAL PROVIDER NOTES ---------------------------------  At this time the patient is without objective evidence of an acute process requiring hospitalization or inpatient management. They have remained hemodynamically stable throughout their entire ED visit and are stable for discharge with outpatient follow-up. The plan has been discussed in detail and they are aware of the specific conditions for emergent return, as well as the importance of follow-up. New Prescriptions    AZITHROMYCIN (ZITHROMAX Z-MAYUR) 250 MG TABLET    TAKE 500MG PO DAY ONE. .. 250MG PO DAY TWO THROUGH FIVE   DISPENSE 6 TABS  NO REFILLS    PREDNISONE (DELTASONE) 50 MG TABLET    Take 1 tablet by mouth daily for 4 days       Diagnosis:  1. COPD exacerbation ([address]    2. Liver hemangioma        Disposition:  Patient's disposition: Discharge to home  Patient's condition is stable.          Kimberley Hu DO  10/25/21 1730

## 2021-10-25 NOTE — ED NOTES
FIRST PROVIDER CONTACT ASSESSMENT NOTE                                                                                                Department of Emergency Medicine                                                      First Provider Note  10/25/21  1:51 PM EDT  NAME: Bib Dash  : 1980  MRN: [id number]    Chief Complaint: Cough (strong and productive) and Shortness of Breath (has nebulizer at home, increased use)      History of Present Illness:   Bib Dash is a 36 y.o. male who presents to the ED for SOB, cough x 2 weeks. Focused Physical Exam:  VS:    ED Triage Vitals [10/25/21 1313]   BP Temp Temp Source Pulse Resp SpO2 Height Weight   -- 97.1 °F (36.2 °C) Tympanic 90 -- 97 % -- --        General: Alert and in no apparent distress. Medical History:  has a past medical history of Asthma, GERD (gastroesophageal reflux disease), HIV (human immunodeficiency virus infection) ([facility] [address] and Psoriasis. Surgical History:  has a past surgical history that includes other surgical history (). Social History:  reports that he has been smoking cigarettes. He has been smoking about 1.00 pack per day. He has never used smokeless tobacco. He reports previous alcohol use. He reports current drug use. Drugs: Methamphetamines, Other-see comments, and Marijuana. Family History: family history includes Arthritis in his brother and mother; Other in his mother; Thyroid Disease in his mother. Allergies: Patient has no known allergies.      Initial Plan of Care:  Initiate Treatment-Testing, Proceed toTreatment Area When Bed Available for ED Attending/MLP to Continue Care    -------------------------------------------------END OF FIRST PROVIDER CONTACT ASSESSMENT NOTE--------------------------------------------------------  Electronically signed by AXEL Peralta   DD: 10/25/21       George Peralta  10/25/21 2343

## 2021-10-25 NOTE — ED NOTES
Pt presents with reports of sob, and pleuritic pain. Pt has a productive cough and expiratory wheezing.       Hue Sue RN  10/25/21 8283

## 2021-10-25 NOTE — ED NOTES
Pt provided with z-pack and steroid RX. Pt states and understanding of the importance of follow up care.       Josy Rob RN  10/25/21 1085

## 2021-11-01 ENCOUNTER — HOSPITAL ENCOUNTER (OUTPATIENT)
Dept: PSYCHIATRY | Age: 41
Setting detail: THERAPIES SERIES
Discharge: HOME | End: 2021-11-01
Payer: COMMERCIAL

## 2021-11-01 PROCEDURE — H2020 THER BEHAV SVC, PER DIEM: HCPCS

## 2021-11-01 NOTE — PLAN OF CARE
Date:  11/1/2021  Start Time:  8:45 AM  End Time:   10:15 AM  Number of participants: 6  Type of group: Psychotherapy  Mode of intervention: Education, Support, Socialization and Exploration  Topic:  Stressors  Objective: To increase socialization and improve interpersonal relationships. To explore solutions and coping skills related to stressors. Note:  Patients shared their five-category assessment (body, mind, emotions, spirit, relationships), goal for the group, and progress they have made related to objectives. Patients shared recent stressors and triggers and how they coped and responded to them. The group provided feedback. Shared some of his mental health, relationship, and substance use history. He said he was having some conflicts with the  at his sober house. Status after intervention:Improved  Participation level:  Active Listener, Interactive and Monopolizing  Participation Quality: Attentive and Sharing  Speech: normal  Thought Process/Content:Flight of ideas  Mood/Affect: irritable  Self report: None Reported  Response to learning: Able to verbalize current knowledge/experience  Discipline Responsible: /Counselor     Electronically signed by Garry Pate [facility] on 11/1/2021 at 3:54 PM

## 2021-11-01 NOTE — PLAN OF CARE
Date: 11/1/2021  Start Time:  10:30 AM  End Time:  12:00 PM  Number of participants: 6  Type of group: Recovery  Mode of intervention: Education, Support, Socialization and Exploration  Topic: [place] self-help groups  Objective: To learn about mutual self-help groups and values       Patients learned about and discussed the differences between treatment and recovery and the benefits of mutual self-help groups. They also learned about and discussed the importance of honesty and trust and how they related to their co-occurring disorders. He said treatment and groups helped to increase his hope and help him believe that I can be something.   He said he needs to regain his familys respect and he believes that recovery can give him a different outcome. Status after intervention:Improved  Participation level:  Active Listener and Interactive  Participation Quality: Appropriate, Attentive and Sharing  Speech: normal  Thought Process/Content:Logical  Linear  Mood/Affect: congruent  Self report: None Reported  Response to learning: Able to verbalize current knowledge/experience  Discipline Responsible: /Counselor     Electronically signed by Talia Sparks [facility] on 11/1/2021 at 4:17 PM

## 2021-11-02 ENCOUNTER — HOSPITAL ENCOUNTER (OUTPATIENT)
Dept: PSYCHIATRY | Age: 41
Setting detail: THERAPIES SERIES
Discharge: HOME | End: 2021-11-02
Payer: COMMERCIAL

## 2021-11-02 PROCEDURE — H2020 THER BEHAV SVC, PER DIEM: HCPCS

## 2021-11-02 PROCEDURE — 99203 OFFICE O/P NEW LOW 30 MIN: CPT

## 2021-11-02 NOTE — PLAN OF CARE
Date:  11/2/2021  Start Time:  8:45 AM  End Time:  10:15 AM   Number of participants:  6  Type of group: Psychotherapy  Mode of intervention: Education, Support, Socialization and Exploration  Topic:  Forgiveness  Objective: To increase socialization and improve interpersonal relationships. To learn about forgiveness and begin the stages of forgiveness     Note:  Patients shared their five-category assessment (body, mind, emotions, spirit, relationships), goal for the group, and progress they have made related to objectives. Patients shared recent stressors and triggers and how they coped and responded to them. The group provided feedback. 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  He said he still feels angry in relation to his  but thinking about what he would lose if he reacted negatively has kept him from overreacting. Status after intervention:Improved  Participation level:  Active Listener, Interactive and Monopolizing  Participation Quality: Sharing and Supportive  Speech: normal  Thought Process/Content:Logical  Linear  Mood/Affect: irritable  Self report: None Reported  Response to learning: Able to verbalize current knowledge/experience  Discipline Responsible: /Counselor     Electronically signed by Teresa Cadena [facility] on 11/2/2021 at 2:44 PM

## 2021-11-02 NOTE — PLAN OF CARE
Date:  11/2/2021  Start Time:   10:30 AM  End Time:   12:00 PM  Number of participants: 6  Type of group: Recovery  Mode of intervention: Education, Support, Socialization, Exploration and Clarifying  Topic:  Steps 1, 2 and 3  Objective: To learn about steps one, two and three of the 12 steps    Note:  Patients discussed and answered questions related to steps one, two, and three of the twelve steps. Recovery concepts related to the first three steps such as admitting the problem, fellowship, and surrendering to a higher power were also discussed and journaled about. Patient participated in the group discussion and related well with others. He said he knew his life was unmanageable and he was powerless when he couldnt hide his drug use anymore and he was homeless. Status after intervention:Improved  Participation level:  Active Listener and Interactive  Participation Quality: Appropriate, Attentive and Sharing  Speech: normal  Thought Process/Content:Logical  Linear  Mood/Affect: congruent  Self report: None Reported  Response to learning: Able to verbalize current knowledge/experience, Able to verbalize/acknowledge new learning, Able to retain information, Capable of insight and Progressing to goal  Discipline Responsible: /Counselor     Electronically signed by Pippa Vyas, [facility] on 11/2/2021 at 2:52 PM

## 2021-11-02 NOTE — PROGRESS NOTES
CLINICAL PHARMACY NOTE: MEDS TO BEDS    Total # of Prescriptions Filled: 1   The following medications were delivered to the patient:  · Azithromycin 250mg    Additional Documentation:  Pt picked up at pharmacy

## 2021-11-02 NOTE — PROGRESS NOTES
PSYCHIATRIC EVALUATION      CHIEF COMPLAINT:  \"I don't like mental medications. \"    HISTORY OF PRESENT ILLNESS: Lexx Pendleton  is a 36 y.o. male who presents for psychiatric evaluation as part of intake into [facility]. with history of treatment for acute psychosis and amphetamine abuse. Last used 9/2/21. On that day, patient was admitted to inpatient psych 9/2/21 and discharged 9/8/21. Patient was started on Zyprexa 15 mg nightly for outpatient medication but did not take it because he states it made him feel more irritable. Currently, patient presents as hypomanic, talkative, distractible with racing thoughts. States he has not been sleeping well at the sober house. Patient states he does not like the sobor living house. Patient states \"I love my hyperness, I do not want any medications. I like to talk to people and get things off your chest.\" Giovanna Gallegos states that he loves groups. He is getting things off his chest and getting around people he can be free to talk too. States he is HIV positive and [facility] is trying to find him a place to live. Patient denies suicidal or homicidal ideation. No paranoia or psychosis. PAST PSYCHIATRIC HISTORY:  December 2019 [facility] for amphetamine abuse. Drug rehab in [address] [place] [address] of this year. Just got out of [facility] in [place]. He finished the program. December 2019 tried to OD on Methamphetamine. PAST MEDICAL HISTORY:       Diagnosis Date    Asthma     GERD (gastroesophageal reflux disease)     HIV (human immunodeficiency virus infection) ([facility]     Psoriasis      ALLERGIES: Patient has no known allergies. FAMILY PSYCHIATRIC HISTORY: Denies    SOCIAL HISTORY: From [place] raised by mother. Went to [email or web address]. Got GED. Lived  in [facility] [place] [facility] since [place] and moved back. Currently not working. Living in [facility] [address] [place]. Never been  no children. SUBSTANCE ABUSE HISTORY: Amphetamine abuse. HX of alcohol.  Has not drank alcohol since May of this year. MENTAL STATUS EXAM:  male appears age. Pleasant, cooperative, forthcoming. Psychomotor activity is restless. Good eye contact. [place], strength and tone normal. Mood anxious and congruent affect. Speech clear and accelerated but not pressured. Thought process is organized. No evidence of suicidal or homicidal ideations, intentions or plans. Orientation, concentration, recent and remote memory are grossly intact. Fund of knowledge fair. Language use fair. Insight and judgment fair. MEDICATIONS:  Refusing medications at this time     ASSESSMENT:  Bipolar II     Substance abuse    PLAN: Admit to [facility]. Treatment will include group therapy, relapse prevention, case management and pharmacotherapy. Support and reassurance provided. Patient is not agreeable to starting medication at this time. Discussed melatonin for sleep. See back in two weeks with Dr. Pb Kaur.       Signed:  Electronically signed by JUANA Collado CNP on 11/2/2021 at 11:45 AM

## 2021-11-04 ENCOUNTER — HOSPITAL ENCOUNTER (OUTPATIENT)
Dept: PSYCHIATRY | Age: 41
Setting detail: THERAPIES SERIES
Discharge: HOME | End: 2021-11-04
Payer: COMMERCIAL

## 2021-11-04 PROCEDURE — H2020 THER BEHAV SVC, PER DIEM: HCPCS

## 2021-11-04 NOTE — PLAN OF CARE
Date:  11/4/2021  Start Time:  8:45 AM  End Time:  10:15 AM  Number of participants: 6  Type of group: Psychotherapy  Mode of intervention: Education, Support, Socialization, Exploration and Clarifying  Topic:  Values  Objective: To increase socialization and improve interpersonal relationships. To identify and explore their values. Note: Patients shared their five-category assessment (body, mind, emotions, spirit, relationships), goal for the group, and progress they have made related to objectives. Patients shared recent stressors and triggers and how they coped and responded to them. They also identified how they could improve how they coped and responded to their stressors and triggers. The group provided feedback. Patients identified, explored, and discussed their values including how their values compare to the values of their family and society. They also compared the values they would like to live by to the values they actually live by. Patient completed writing assignments and participated in group discussion. He shared how he had a good birthday yesterday volunteering to feed the homeless. He also had a good day with his family and house mates. Status after intervention:Improved  Participation level:  Active Listener and Interactive  Participation Quality: Appropriate, Attentive and Sharing  Speech: normal  Thought Process/Content:Logical  Linear  Mood/Affect: euphoria  Self report: None Reported  Response to learning: Able to verbalize current knowledge/experience, Able to verbalize/acknowledge new learning, Able to retain information, Capable of insight, Able to change behavior and Progressing to goal  Discipline Responsible: /Counselor     Electronically signed by EMILY Snow on 11/4/2021 at 1:32 PM

## 2021-11-05 ENCOUNTER — OFFICE VISIT (OUTPATIENT)
Dept: FAMILY MEDICINE CLINIC | Age: 41
End: 2021-11-05
Payer: COMMERCIAL

## 2021-11-05 VITALS
TEMPERATURE: 99.7 F | HEIGHT: 67 IN | BODY MASS INDEX: 34.69 KG/M2 | HEART RATE: 108 BPM | WEIGHT: 221 LBS | RESPIRATION RATE: 18 BRPM | SYSTOLIC BLOOD PRESSURE: 120 MMHG | DIASTOLIC BLOOD PRESSURE: 76 MMHG | OXYGEN SATURATION: 96 %

## 2021-11-05 DIAGNOSIS — J45.40 MODERATE PERSISTENT ASTHMA WITHOUT COMPLICATION: ICD-10-CM

## 2021-11-05 DIAGNOSIS — D18.03 LIVER HEMANGIOMA: ICD-10-CM

## 2021-11-05 DIAGNOSIS — K21.9 GASTROESOPHAGEAL REFLUX DISEASE, UNSPECIFIED WHETHER ESOPHAGITIS PRESENT: ICD-10-CM

## 2021-11-05 DIAGNOSIS — Z09 HOSPITAL DISCHARGE FOLLOW-UP: Primary | ICD-10-CM

## 2021-11-05 DIAGNOSIS — Z21 ASYMPTOMATIC HIV INFECTION, WITH NO HISTORY OF HIV-RELATED ILLNESS (HCC): ICD-10-CM

## 2021-11-05 DIAGNOSIS — Z72.0 TOBACCO USE: ICD-10-CM

## 2021-11-05 DIAGNOSIS — Z23 NEED FOR IMMUNIZATION AGAINST INFLUENZA: ICD-10-CM

## 2021-11-05 PROCEDURE — 99213 OFFICE O/P EST LOW 20 MIN: CPT | Performed by: FAMILY MEDICINE

## 2021-11-05 PROCEDURE — G8482 FLU IMMUNIZE ORDER/ADMIN: HCPCS | Performed by: FAMILY MEDICINE

## 2021-11-05 PROCEDURE — 4004F PT TOBACCO SCREEN RCVD TLK: CPT | Performed by: FAMILY MEDICINE

## 2021-11-05 PROCEDURE — G8417 CALC BMI ABV UP PARAM F/U: HCPCS | Performed by: FAMILY MEDICINE

## 2021-11-05 PROCEDURE — G8427 DOCREV CUR MEDS BY ELIG CLIN: HCPCS | Performed by: FAMILY MEDICINE

## 2021-11-05 RX ORDER — PANTOPRAZOLE SODIUM 40 MG/1
40 TABLET, DELAYED RELEASE ORAL
Qty: 90 TABLET | Refills: 1 | Status: SHIPPED
Start: 2021-11-05 | End: 2021-12-22 | Stop reason: SDUPTHER

## 2021-11-05 RX ORDER — CALORIC SUPPLEMENT
1 LIQUID (ML) ORAL 3 TIMES DAILY
Qty: 100 EACH | Refills: 2 | Status: SHIPPED
Start: 2021-11-05 | End: 2021-12-22

## 2021-11-05 ASSESSMENT — PATIENT HEALTH QUESTIONNAIRE - PHQ9
SUM OF ALL RESPONSES TO PHQ QUESTIONS 1-9: 2
2. FEELING DOWN, DEPRESSED OR HOPELESS: 1
SUM OF ALL RESPONSES TO PHQ9 QUESTIONS 1 & 2: 2
1. LITTLE INTEREST OR PLEASURE IN DOING THINGS: 1
SUM OF ALL RESPONSES TO PHQ QUESTIONS 1-9: 2
SUM OF ALL RESPONSES TO PHQ QUESTIONS 1-9: 2

## 2021-11-05 NOTE — PROGRESS NOTES
Vaccine Information Sheet, \"Influenza - Inactivated\"  given to Ashleigh Hartman, or parent/legal guardian of  Ashleigh Hartman and verbalized understanding. Patient responses:    Have you ever had a reaction to a flu vaccine? No  Do you have any current illness? No  Have you ever had Guillian Colstrip Syndrome? No  Do you have a serious allergy to any of the follow: Neomycin, Polymyxin, Thimerosal, eggs or egg products? No      Flu vaccine given per order. Please see immunization tab. Risks and benefits explained. Current VIS given.

## 2021-11-05 NOTE — PROGRESS NOTES
Attending Physician Statement    S:   Chief Complaint   Patient presents with    ED Follow-up    Discuss Medications      Patient is a 39year old male here for ER follow up . Seen on 10/25 for shortness of breath. Liver hemangioma - recommended MRI follow up as outpatient. Has history of asthma and uses symbicort . Uses albuterol mulitple times a day. Has cough, yellow sputum no blood. O: Blood pressure 120/76, pulse 108, temperature 99.7 °F (37.6 °C), temperature source Temporal, resp. rate 18, height 5' 7\" (1.702 m), weight 221 lb (100.2 kg), SpO2 96 %. Exam:   Heart - RRR   Lungs - + wheeze   A: COPD , liver hemangioma, hiv , GERD   P:  PFTs. Continue symbicort and albuterol. MRI abdomen with contrast.    protonix    Referral to GI for GERD    Follow-up as ordered    Attending Attestation   I have discussed the case, including pertinent history and exam findings with the resident. I agree with the documented assessment and plan.

## 2021-11-05 NOTE — PROGRESS NOTES
[facility]  FAMILY MEDICINE RESIDENCY PROGRAM  DATE OF VISIT : 2021    Patient : Abdelrahman Sutton   Age : 39 y.o.  : 1980   MRN : <[id number]>   ______________________________________________________________________    Chief Complaint :   Chief Complaint   Patient presents with    ED Follow-up    Discuss Medications       HPI : Abdelrahman Sutton is 39 y.o. male who presented to the clinic today for ER follow up. ER Summary (10/25/21): Shortness of Breath  Severity:  Mild  Onset quality:  Gradual  Duration:  1 week  Timing:  Constant  Progression:  Unchanged  Chronicity:  New  Context: URI    Relieved by:  Nothing  Worsened by: Activity, exertion and coughing  Ineffective treatments:  Inhaler and rest (steroids)  Associated symptoms: cough, sore throat, sputum production and wheezing    Associated symptoms: no abdominal pain, no chest pain, no claudication, no diaphoresis, no fever, no headaches, no hemoptysis, no neck pain, no syncope and no vomiting    Risk factors: no hx of PE/DVT, no obesity, no prolonged immobilization, no recent surgery and no tobacco use    Risk factors comment:  She has asthma COPD and is currently on steroids and inhalers. Family history of PE    Review of Systems   Constitutional: Positive for activity change, chills and fatigue. Negative for diaphoresis and fever. HENT: Positive for congestion and sore throat. Respiratory: Positive for cough, sputum production, chest tightness, shortness of breath and wheezing. Negative for hemoptysis.       Physical Exam  Vitals and nursing note reviewed. Constitutional:       General: He is not in acute distress. Appearance: He is well-developed and normal weight. He is not ill-appearing or toxic-appearing. HENT:      Head: Normocephalic and atraumatic. Mouth/Throat:      Mouth: Mucous membranes are moist.     Pulmonary:      Effort: Pulmonary effort is normal. Tachypnea present. No respiratory distress. Breath sounds: Wheezing present. No rales.           ED Course as of Oct 25 1737   Mon Oct 25, 2021   1734 5:34 PM EDT  I received this patient at sign out from Dr. Catherine Christie. I have discussed the patient's initial exam, treatment and plan of care with the out going physician. I have introduced my self to the patient / family and have answered their questions to this point. I have examined the patient myself and reviewed ordered tests / medications and  reviewed any available results to this point. If a resident is involved in the Emergency Department care, I have discussed my findings and plan with them as well. Patient sitting up in a chair resting comfortably no distress. Normal pulse ox. No respiratory distress. Scattered expiratory wheezing. States he has inhalers as well as nebulizer machine and medications at home. States he has had a cough and congestion for the last 3 to 4 weeks. Close outpatient follow-up as well as treatment with steroids and antibiotics are discussed with him, CT results are discussed as well as incidental liver findings and need for close outpatient follow-up, he verbalizes understanding. [NC]       ED Course User Index  [NC] Roxy Flores, DO               New Prescriptions     AZITHROMYCIN (ZITHROMAX Z-MAYUR) 250 MG TABLET    TAKE 500MG PO DAY ONE. .. 250MG PO DAY TWO THROUGH FIVE   DISPENSE 6 TABS  NO REFILLS     PREDNISONE (DELTASONE) 50 MG TABLET    Take 1 tablet by mouth daily for 4 days         Diagnosis:  1. COPD exacerbation ([address]    2. Liver hemangioma             CURRENT STATUS (11/5/21):  Currently reports breathing feels about the same. Reports compliance with Symbicort and albuterol, has been using nebulizer at least daily. Patient reports no PFT testing. Patient agreeable to obtaining at this time. Patient continues to report cough productive of yellow sputum. No blood. Patient denies any fevers, nausea, vomiting.  Patient does continue to smoke, not interested in cessation at this time, working on sober living from other drug use at this time. Noted liver hemangioma on CTA pulmonary obtained in ER. Recommended follow-up with MRI abdomen with contrast to further assess. Patient reports occasional epigastric and right upper quadrant pain. Does have history of GERD for which he takes omeprazole. Patient denies any early satiety or weight loss. Past Medical History :  Past Medical History:   Diagnosis Date    Asthma     GERD (gastroesophageal reflux disease)     HIV (human immunodeficiency virus infection) ([address]     Psoriasis      Past Surgical History:   Procedure Laterality Date    OTHER SURGICAL HISTORY  2007    patient states he went to surgery to have a boil removed          Review of Systems :    ROS - Per HPI       ______________________________________________________________________    Physical Exam :    Wt Readings from Last 3 Encounters:   11/05/21 221 lb (100.2 kg)   10/25/21 220 lb (99.8 kg)   09/17/21 206 lb (93.4 kg)       BMI Readings from Last 3 Encounters:   11/05/21 34.61 kg/m²   10/25/21 34.46 kg/m²   09/17/21 32.26 kg/m²   ]      Vitals: /76 (Site: Left Upper Arm, Position: Sitting, Cuff Size: Large Adult)   Pulse 108   Temp 99.7 °F (37.6 °C) (Temporal)   Resp 18   Ht 5' 7\" (1.702 m)   Wt 221 lb (100.2 kg)   SpO2 96%   BMI 34.61 kg/m²     Physical Exam  Constitutional:       General: He is not in acute distress. Appearance: He is well-developed. HENT:      Head: Normocephalic and atraumatic. Eyes:      Conjunctiva/sclera: Conjunctivae normal.      Pupils: Pupils are equal, round, and reactive to light. Neck:      Thyroid: No thyromegaly. Trachea: No tracheal deviation. Cardiovascular:      Rate and Rhythm: Normal rate and regular rhythm. Heart sounds: Normal heart sounds. No murmur heard. Pulmonary:      Effort: Pulmonary effort is normal. No respiratory distress.       Breath sounds: Wheezing (scattered expiratory) present. No rales. Abdominal:      General: Bowel sounds are normal. There is no distension. Palpations: Abdomen is soft. Tenderness: There is abdominal tenderness (mild over epigastrium, RUQ). Musculoskeletal:         General: Normal range of motion. Cervical back: Normal range of motion and neck supple. Lymphadenopathy:      Cervical: No cervical adenopathy. Skin:     General: Skin is warm and dry. Capillary Refill: Capillary refill takes less than 2 seconds. Findings: No rash. Neurological:      Mental Status: He is alert and oriented to person, place, and time. Cranial Nerves: No cranial nerve deficit. Deep Tendon Reflexes: Reflexes normal.   Psychiatric:         Behavior: Behavior normal.         ______________________________________________________________________    Assessment & Plan :     Diagnosis Orders   1. Hospital discharge follow-up      from ER   2. Liver hemangioma  MRI Geraldo Nguyen MD, Gastroenterology, [place]   3. Moderate persistent asthma without complication  Full PFT Study With Bronchodilator   4. Asymptomatic HIV infection, with no history of HIV-related illness ([facility] [address]  BASIC METABOLIC PANEL    HIV RNA, Quantitative, PCR   5. Gastroesophageal reflux disease, unspecified whether esophagitis present  pantoprazole (PROTONIX) 40 MG tablet   6. Tobacco use     7. Need for immunization against influenza  INFLUENZA, QUADV, 3 YRS AND OLDER, IM PF, PREFILL SYR OR SDV, 0.5ML (AFLURIA QUADV, PF)       ER follow-up: Med rec completed, events reviewed and discussed with patient. Recommend patient to have PFTs to assess lung function at this time to ensure adequate therapy, agreeable at this time. Order placed at this visit. Signs and symptoms warranting emergent evaluation reviewed with patient. Continue present management for now.   Liver hemangioma: Incidental finding on CT pulmonary, MRI recommended and ordered at this time, will refer to GI for further evaluation as well as possible EGD with ongoing GERD. GERD: Symptoms uncontrolled, switch to Protonix 40 mg, GERD diet reviewed with patient during office visit  Health maintenance: Flu shot today      Follow up:  Return in about 6 weeks (around 12/17/2021) for review test results, fasting labs one week prior to appointment.        Mehnaz Booker MD PGY-3    Discussed with: Dr. Bridget Gan

## 2021-11-08 ENCOUNTER — HOSPITAL ENCOUNTER (OUTPATIENT)
Dept: PSYCHIATRY | Age: 41
Setting detail: THERAPIES SERIES
Discharge: HOME | End: 2021-11-08
Payer: COMMERCIAL

## 2021-11-08 PROCEDURE — H2020 THER BEHAV SVC, PER DIEM: HCPCS

## 2021-11-08 NOTE — PROGRESS NOTES
Date:  11/8/2021  Start Time:  8:45 AM  End Time:  10:15 AM  Number of participants:  6  Type of group: Psychotherapy  Mode of intervention: Education, Support, Socialization, Exploration and Clarifying  Topic: Values self-exploration  Objective: To explore personal values     Note: Patients explored nine different areas of life. They explored how important each area is to them,  whether or not they are contributing enough time and effort toward each area, and how they would like to improve each area. Patient said he had been able to forgive someone and he was feeling at ease, clear, and grateful.   His relationships were also growing because he reconnected with old friends. Patient completed writing assignment and participated in group discussion. He said family relationships are most important to him. Status after intervention:Improved  Participation level:  Active Listener and Interactive  Participation Quality: Appropriate, Attentive and Sharing  Speech: normal  Thought Process/Content:Logical  Linear  Mood/Affect: euphoria  Self report: None Reported  Response to learning: Able to verbalize current knowledge/experience  Discipline Responsible: /Counselor     Electronically signed by Aye Flores [facility] on 11/8/2021 at 3:11 PM

## 2021-11-09 ENCOUNTER — TELEPHONE (OUTPATIENT)
Dept: FAMILY MEDICINE CLINIC | Age: 41
End: 2021-11-09

## 2021-11-09 ENCOUNTER — HOSPITAL ENCOUNTER (OUTPATIENT)
Dept: PSYCHIATRY | Age: 41
Setting detail: THERAPIES SERIES
Discharge: HOME | End: 2021-11-09
Payer: COMMERCIAL

## 2021-11-09 PROCEDURE — H2020 THER BEHAV SVC, PER DIEM: HCPCS

## 2021-11-10 NOTE — CARE COORDINATION
Date:  11/10/2021  Patient was discussed in a team meeting with Dr. Shanice Alegria. Patients hyperactivity and hyper talkativeness were discussed. However, it was reported that he is actively participating in group and offering support to others. Patient will continue with dual-diagnosis IOP and continue seeing Dr. Shanice Alegria for medication management.       Electronically signed by Lucius Veliz on 11/10/2021 at 2:01 PM

## 2021-11-10 NOTE — PROGRESS NOTES
Date:  11/9/2021  Start Time:  10:30 AM  End Time:  12:00 PM  Number of participants:  7  Type of group: Recovery  Mode of intervention: Education, Exploration and Clarifying  Topic:   Steps 10, 11, and 12  Objective:   Learn about steps 10, 11, and 12 of the 12-step process    Note: Patients learned about steps 10, 11, and 12 of the 12-step process and they discussed related concepts such as taking a personal inventory, apologizing, prayer and meditation, spiritual awakening, and service. Patient completed the writing assignment and participated in the group discussion. Status after intervention:Improved  Participation level:  Active Listener and Interactive  Participation Quality: Appropriate, Attentive and Sharing  Speech: normal  Thought Process/Content:Logical  Linear  Mood/Affect: congruent  Self report: None Reported  Response to learning: Able to verbalize current knowledge/experience, Able to verbalize/acknowledge new learning, Able to retain information, Capable of insight and Progressing to goal  Discipline Responsible: /Counselor    Electronically signed by Talia Sparks [facility] on 11/10/2021 at 8:48 AM

## 2021-11-11 ENCOUNTER — HOSPITAL ENCOUNTER (OUTPATIENT)
Dept: PSYCHIATRY | Age: 41
Setting detail: THERAPIES SERIES
Discharge: HOME | End: 2021-11-11
Payer: COMMERCIAL

## 2021-11-11 PROCEDURE — H2020 THER BEHAV SVC, PER DIEM: HCPCS

## 2021-11-13 NOTE — PROGRESS NOTES
PT denies suicidal ideations, homicidal ideations and hallucinations. Pt out on the unit. Social and brightened. Pt stated he knows he took a step backwards \"I took to doing this when I was old. I never messed around with this stuff when I was young because I was more focused on my kids and stuff. I had to wait until I got old to do this. No more. \" Pt stated he is going back to the big house at sober living. \"When you need guidance you go to the big house. When you do good, you get to go to a smaller house with 4 or 5 people less rules. \" Pt stated \"I always believed this rule. If you name ain't on the lease, you are homeless. \" Pt is calm and cooperative. Pleasant. Denies anxiety and depression. Presents brightened. Out on the unit. Medication compliant. Will continue to monitor. No

## 2021-11-15 ENCOUNTER — HOSPITAL ENCOUNTER (OUTPATIENT)
Dept: PSYCHIATRY | Age: 41
Setting detail: THERAPIES SERIES
End: 2021-11-15
Payer: COMMERCIAL

## 2021-11-16 ENCOUNTER — HOSPITAL ENCOUNTER (OUTPATIENT)
Dept: PSYCHIATRY | Age: 41
Setting detail: THERAPIES SERIES
Discharge: HOME | End: 2021-11-16
Payer: COMMERCIAL

## 2021-11-16 DIAGNOSIS — F15.90 STIMULANT USE DISORDER: ICD-10-CM

## 2021-11-16 PROCEDURE — H2020 THER BEHAV SVC, PER DIEM: HCPCS

## 2021-11-16 PROCEDURE — 99213 OFFICE O/P EST LOW 20 MIN: CPT | Performed by: PSYCHIATRY & NEUROLOGY
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
## 2021-11-16 NOTE — PROGRESS NOTES
Date:  11/16/2021  Start Time:  8:45 AM  End Time:  10:15 AM  Number of participants: 6  Type of group: Psychotherapy  Mode of intervention:  Support, Socialization, Exploration Clarifying and Problem Solving  Topic: Progress and barriers to progress  Objective: To increase socialization and improve interpersonal relationships. To share progress and explore ways to overcome barriers to progress. Note: Patients shared their five-category assessment (body, mind, emotions, spirit, relationships), goal for the group, and progress they have made related to objectives. Patients shared recent stressors and triggers and how they coped and responded to them. They also identified how they could improve how they coped and responded to their stressors and triggers. The group provided feedback. He said he had a craving over the weekend after a housemate made a negative comment about him, but he talked to a friend about it to cope. Patient shared that he attended a crystal meth anonymous ([address] [place] [address] [employer]) meeting yesterday which he enjoyed. He was supportive to others in the group. Status after intervention:Improved  Participation level:  Active Listener and Interactive  Participation Quality: Appropriate, Attentive, Sharing and Supportive  Speech: normal  Thought Process/Content:Linear  Mood/Affect: spontaneous  Self report: None Reported  Response to learning: Able to verbalize current knowledge/experience, Able to verbalize/acknowledge new learning, Capable of insight and Progressing to goal  Discipline Responsible: /Counselor     Electronically signed by Desiree Gibbons, [facility] on 11/16/2021 at 4:05 PM

## 2021-11-16 NOTE — PROGRESS NOTES
Date:  11/16/2021  Start Time:  10:30 AM  End Time:  12:00 PM  Number of participants: 6  Type of group: Recovery  Mode of intervention: Education, Exploration and Clarifying  Topic: Core beliefs  Objective: To identify, explore, and challenge core beliefs and replace them with positive affirmations      Note:  Patients learned about core beliefs. Then they identified one of their core beliefs and they wrote information that helped them arrive at and continue this belief. They also identified contradictory evidence to one of their core beliefs. They shared and discussed their assignments with the group. Patients were given a list of 101 positive things to say to themselves and identified positive affirmations from the list that would be helpful to them. Patients core belief was I am worthless.   He was able to find contradictory evidence and was receptive to feedback. Status after intervention:Improved  Participation level:  Active Listener and Interactive  Participation Quality: Appropriate, Attentive and Sharing  Speech: normal  Thought Process/Content:Logical  Linear  Mood/Affect: congruent  Self report: None Reported  Response to learning: Able to verbalize current knowledge/experience, Able to verbalize/acknowledge new learning, Able to retain information, Capable of insight and Progressing to goal  Discipline Responsible: /Counselor     Electronically signed by Pippa Vyas, [facility] on 11/16/2021 at 4:18 PM

## 2021-11-18 ENCOUNTER — HOSPITAL ENCOUNTER (OUTPATIENT)
Dept: PSYCHIATRY | Age: 41
Setting detail: THERAPIES SERIES
Discharge: HOME | End: 2021-11-18
Payer: COMMERCIAL

## 2021-11-18 PROCEDURE — H2020 THER BEHAV SVC, PER DIEM: HCPCS

## 2021-11-18 NOTE — PROGRESS NOTES
Date:  11/18/2021  Start Time:  8:45 AM  End Time:  10:15 AM  Number of participants:  4  Type of group: Psychotherapy  Mode of intervention: Education, Support, Socialization, Exploration, Problem-solving and Confrontation  Topic:  Self-care  Objective: To increase socialization and improve interpersonal relationships. Identify areas of self-care to improve. Note: Patients shared their five-category assessment (body, mind, emotions, spirit, relationships), goal for the group, and progress they have made related to objectives. Patients shared recent stressors and triggers and how they coped and responded to them. They also identified how they could improve how they coped and responded to their stressors and triggers. The group provided feedback. Patients completed a self-care assessment. They discussed areas of self-care they are doing well in and areas they would like to improve. They also identified challenges related to self-care and learned some self-care tips. He said he was planning to use marijuana when he lived on his own and wasnt receptive to feedback. He said he was struggling at his sober house because he was surrounded by so many people and it was becoming disorganized. He completed his assessment and said he wants to focus on improving his physical self-care. Patient was supportive to peers. Status after intervention:Improved  Participation level:  Active Listener and Interactive  Participation Quality: Appropriate, Attentive and Sharing  Speech: normal  Thought Process/Content:Logical  Linear  Mood/Affect: calm  Self report: None Reported  Response to learning: Able to verbalize current knowledge/experience, Able to verbalize/acknowledge new learning and Capable of insight  Discipline Responsible: /Counselor    Electronically signed by EMILY Wan on 11/18/2021 at 3:00 PM

## 2021-11-18 NOTE — PROGRESS NOTES
Date:  11/18/2021  Start Time:  10:30 AM  End Time:  12:00 PM  Number of participants:  4  Type of group: Recovery  Mode of intervention: Education, Exploration and Problem-solving  Topic: Sober support system  Objective: To learn how to build a healthy sober support system    Note:  Patients discussed building their recovery support system. Topics included categories of recovery support persons, how to tell their medical providers they are in recovery, ways they would like their families to be supportive, finding a home group, getting a sponsor, and ways to make healthy friends. They also learned and discussed criteria to evaluate meetings and potential sponsors. He said he would his family and friends to provide transportation, but he doesnt want to become too dependent on them. He shared some interests and hobbies he could use to make friends. He shared qualities he likes in his sponsor and in meetings. Status after intervention:Improved  Participation level:  Active Listener and Interactive  Participation Quality: Appropriate, Attentive and Sharing  Speech: normal  Thought Process/Content:Logical  Linear  Mood/Affect: calm  Self report: None Reported  Response to learning: Able to verbalize current knowledge/experience  Discipline Responsible: /Counselor    Electronically signed by EMILY Su on 11/18/2021 at 3:02 PM

## 2021-11-22 ENCOUNTER — HOSPITAL ENCOUNTER (OUTPATIENT)
Dept: PSYCHIATRY | Age: 41
Setting detail: THERAPIES SERIES
Discharge: HOME | End: 2021-11-22
Payer: COMMERCIAL

## 2021-11-22 PROCEDURE — H2020 THER BEHAV SVC, PER DIEM: HCPCS

## 2021-11-22 NOTE — PROGRESS NOTES
Date:  11/22/2021  Start Time:  10:30 AM  End Time:  12:00 Pm  Number of participants:  5  Type of group: Recovery  Mode of intervention: Education, Support, Socialization, Exploration, Clarifying, Problem-solving, Activity and Confrontation  Topic:   Stages of grief   Objective: To explore the stages of grief and write a goodbye letter to their addiction or substance of choice      Note: Patients learned about the stages of grief and identified how they have experienced each stage related to a loss or their addiction. Patients wrote, shared, and processed a goodbye letter they wrote to their addiction or substance of choice. The group provided feedback. Status after intervention:Improved  Participation level:  Active Listener and Interactive  Participation Quality: Appropriate, Attentive, Sharing and Supportive  Speech: normal  Thought Process/Content:Logical  Linear  Mood/Affect: congruent  Self report: None Reported  Response to learning: Able to verbalize current knowledge/experience, Able to verbalize/acknowledge new learning, Capable of insight and Progressing to goal  Discipline Responsible: /Counselor     Electronically signed by Juju Deleon [facility] on 11/22/2021 at 3:35 PM

## 2021-11-22 NOTE — PROGRESS NOTES
Date:  11/22/2021  Start Time:  8:45 AM  End Time:  10:15 AM  Number of participants: 5  Type of group: Psychotherapy  Mode of intervention: Education, Support, Socialization, Exploration, Clarifying and Problem-solving  Topic: Grief:  Tasks of mourning  Objective: To explore the tasks of mourning and needs of mourning    Note:  Patients shared their five-category assessment (body, mind, emotions, spirit, relationships) and goal for the group. Patients shared recent stressors and triggers and how they coped and responded to them. The group provided feedback. Patient shared some conflicts he had at his sober house, but he said he talked about it with his sponsor which was helpful. He also is planning to start boxing when he gets off black out. Patients learned about the differences between acute, complicated, and integrated grief. They learned about four tasks of mourning and six needs of mourning and discussed ways to complete each task and fulfill each need in healthy ways. He participated in group discussion and was supportive of others. Status after intervention:Improved  Participation level:  Active Listener and Interactive  Participation Quality: Appropriate, Attentive, Sharing and Supportive  Speech: normal  Thought Process/Content:Logical  Linear  Mood/Affect: congruent  Self report: None Reported  Response to learning: Able to verbalize current knowledge/experience and Able to verbalize/acknowledge new learning  Discipline Responsible: /Counselor     Electronically signed by Urvashi Langston [facility] on 11/22/2021 at 3:31 PM

## 2021-11-23 ENCOUNTER — HOSPITAL ENCOUNTER (OUTPATIENT)
Dept: PSYCHIATRY | Age: 41
Setting detail: THERAPIES SERIES
Discharge: HOME | End: 2021-11-23
Payer: COMMERCIAL

## 2021-11-23 PROCEDURE — H2020 THER BEHAV SVC, PER DIEM: HCPCS

## 2021-11-23 NOTE — PROGRESS NOTES
Date:  11/23/2021  Start Time:  10:15 AM  End Time:  12:00 PM  Number of participants: 8  Type of group: Recovery  Mode of intervention: Education, Support, Exploration, Clarifying, Confrontation and Reality-testing  Topic:  Letters from disease of addiction and 12 Steps of Insanity   Objective:  Patients will explore the cost, consequences and effects of their addiction and compare addictive thinking to recovery thinking    Note:  Patients read and discussed two letters written from the disease of addictions perspective and shared how they related to the letters and insights learned from the letters. They also read 12 steps of insanity and were encouraged to compare their own thoughts and behaviors to the 12 steps of recovery and the 12 steps of insanity. Patient said the letter made him feel angry at himself that he let his addiction control him so much. Status after intervention:Improved  Participation level:  Active Listener and Interactive  Participation Quality: Appropriate, Attentive and Sharing  Speech: normal  Thought Process/Content:Logical  Linear  Mood/Affect: congruent  Self report: None Reported  Response to learning: Able to verbalize current knowledge/experience, Able to verbalize/acknowledge new learning and Capable of insight  Discipline Responsible: /Counselor     Electronically signed by Derek Dunne [facility] on 11/23/2021 at 5:20 PM

## 2021-11-23 NOTE — PROGRESS NOTES
Date:  11/23/2021  Start Time:  8:45 AM  End Time:  10: 15 AM   Number of participants:  8  Type of group: Psychotherapy  Mode of intervention: Education, Support, Socialization, Exploration, Problem-solving and Activity  Topic: Questions and Answers related to mental health and recovery    Objective: Discuss patients questions related to mental health and recovery    Note:  Patients shared their five-category assessment (body, mind, emotions, spirit, relationships), goal for the group, and progress they have made related to objectives. Patients shared recent stressors and triggers and how they coped and responded to them. They also identified how they could improve how they coped and responded to their stressors and triggers. The group provided feedback. Patients wrote questions they had related to mental health and recovery and the group answered them based on their own knowledge and experience. Patient said he was not as angry with his , but he did not think he had anything to apologize for. Patient participated in group discussion. Status after intervention:Unchanged  Participation level:  Active Listener and Interactive  Participation Quality: Attentive, Sharing and Supportive  Speech: normal  Thought Process/Content:Linear  Mood/Affect: irritable  Self report: None Reported  Response to learning: Able to verbalize current knowledge/experience  Discipline Responsible: /Counselor     Electronically signed by Machelle Dunn [facility] on 11/23/2021 at 5:14 PM

## 2021-11-24 ENCOUNTER — HOSPITAL ENCOUNTER (OUTPATIENT)
Dept: PSYCHIATRY | Age: 41
Setting detail: THERAPIES SERIES
Discharge: HOME | End: 2021-11-24
Payer: COMMERCIAL

## 2021-11-24 PROCEDURE — H2020 THER BEHAV SVC, PER DIEM: HCPCS

## 2021-11-24 NOTE — PROGRESS NOTES
Date:  11/24/2021  Start Time:  10:30 AM   End Time:  12:00 PM   Number of participants:  5   Type of group: Recovery  Mode of intervention: Education, Support and Exploration  Topic: Family roles in addictive families  Objective: To learn about family roles and identify the roles they and their family members play    Note:  Patients learned about the six different roles in addicted families and patients identified the roles they and their family members play or have played. They discussed some new group dynamics. Group members shared positive things they appreciated about one other group member. Patient participated in group discussions and group activity He said he can be the mascot and lost child because he isolates and feels as if he doesnt fit in anywhere. Status after intervention:Improved  Participation level:  Active Listener and Interactive  Participation Quality: Appropriate, Attentive, Sharing and Supportive  Speech: normal  Thought Process/Content:Logical  Linear  Mood/Affect: brightens  Self report: None Reported  Response to learning: Able to verbalize current knowledge/experience, Able to verbalize/acknowledge new learning and Capable of insight  Discipline Responsible: /Counselor     Electronically signed by Talia Sparks [facility] on 11/24/2021 at 2:17 PM

## 2021-11-24 NOTE — PROGRESS NOTES
Date:  11/24/2021  Start Time:  8:30 AM  End Time:  10:15 AM  Number of participants:  5  Type of group: Psychotherapy  Mode of intervention: Education, Support, Socialization, Problem-solving and Activity  Topic: Gratitude in recovery  Objective: To learn about the importance of gratitude and start practicing gratitude      Note:  Patients shared their five-category assessment (body, mind, emotions, spirit, relationships), goal for the group, and progress they have made related to objectives. Patients shared recent stressors and triggers and how they coped and responded to them. The group provided feedback. Patients read about and discussed the benefits of gratitude in recovery. They learned about gratitude exercises and relationship gratitude tips and shared answers related to personal gratitude prompts with the group. Patient got into another argument with his . Patient explored pros and cons of other living situations. He participated in gratitude activity. Status after intervention:Improved  Participation level:  Active Listener and Interactive  Participation Quality: Appropriate, Attentive and Sharing  Speech: normal  Thought Process/Content:Logical  Linear  Mood/Affect: angry  Self report: None Reported  Response to learning: Able to verbalize current knowledge/experience, Able to verbalize/acknowledge new learning and Capable of insight  Discipline Responsible: /Counselor     Electronically signed by Cody Andrade [facility] on 11/24/2021 at 2:12 PM

## 2021-11-24 NOTE — PROGRESS NOTES
Date:  11/23/2021  Start Time:  10:15 AM  End Time:  12:00 PM  Number of participants: 8  Type of group: Recovery  Mode of intervention: Education, Support, Exploration, Clarifying, Confrontation and Reality-testing  Topic:  Letters from disease of addiction and 12 Steps of Insanity   Objective:  Patients will explore the cost, consequences and effects of their addiction and compare addictive thinking to recovery thinking     Note:  Patients read and discussed two letters written from the disease of addictions perspective and shared how they related to the letters and insights learned from the letters. They also read 12 steps of insanity and were encouraged to compare their own thoughts and behaviors to the 12 steps of recovery and the 12 steps of insanity.      Status after intervention:Improved  Participation level:  Active Listener and Interactive  Participation Quality: Appropriate, Attentive and Sharing  Speech: normal  Thought Process/Content:Logical  Linear  Mood/Affect: congruent  Self report: None Reported  Response to learning: Able to verbalize current knowledge/experience, Able to verbalize/acknowledge new learning and Capable of insight  Discipline Responsible: /Counselor     Electronically signed by Nelda Ribera [facility] on 11/24/2021 at 4:17 PM

## 2021-11-29 ENCOUNTER — HOSPITAL ENCOUNTER (OUTPATIENT)
Dept: PSYCHIATRY | Age: 41
Setting detail: THERAPIES SERIES
Discharge: HOME | End: 2021-11-29
Payer: COMMERCIAL

## 2021-11-29 PROCEDURE — H2020 THER BEHAV SVC, PER DIEM: HCPCS

## 2021-11-29 NOTE — PROGRESS NOTES
Group Therapy Note    Date: 11/29/2021  Start Time: 830  End Time:  945  Number of Participants: 6    Type of Group: Psychotherapy    Patient's Goal:  To increase socialization and improve interpersonal relationships. Notes:  Pt was active participant in group focusing on expression of feelings r/t how stressors and mh affects relationships. Pt shared difficulties in his sober living house with others relapsing but was able to share gratitude that he is developing a better relationship with God and realizes that he has to endure this so he can appreciate getting his own independent housing in a few months and stay clean. Pt was able to share personal experiences in group, provide feedback and support to others, and was receptive to feedback given from others. Status After Intervention:  Improved    Participation Level:  Active Listener and Interactive    Participation Quality: Appropriate, Attentive, Sharing and Supportive      Speech:  normal      Thought Process/Content: Logical      Affective Functioning: Congruent      Mood: euthymic      Level of consciousness:  Alert, Oriented x4 and Attentive      Response to Learning: Able to verbalize current knowledge/experience, Able to verbalize/acknowledge new learning and Progressing to goal      Endings: None Reported    Modes of Intervention: Support, Socialization and Exploration      Discipline Responsible: /Counselor      Signature:  ROSANA Rahman

## 2021-11-30 ENCOUNTER — HOSPITAL ENCOUNTER (OUTPATIENT)
Dept: PSYCHIATRY | Age: 41
Setting detail: THERAPIES SERIES
Discharge: HOME | End: 2021-11-30
Payer: COMMERCIAL

## 2021-11-30 DIAGNOSIS — F15.90 STIMULANT USE DISORDER: ICD-10-CM

## 2021-11-30 PROCEDURE — H2020 THER BEHAV SVC, PER DIEM: HCPCS

## 2021-11-30 PROCEDURE — 99213 OFFICE O/P EST LOW 20 MIN: CPT | Performed by: PSYCHIATRY & NEUROLOGY

## 2021-11-30 NOTE — PROGRESS NOTES
Group Therapy Note    Date: 11/30/2021  Start Time: 1000  End Time:  1100  Number of Participants: 6    Type of Group: Recovery    Wellness Binder Information  Module Name: Nayeli Anxiety Workbook, Exercises 16 and 17  Session Number:  What's beneath our anxiety and Using our wise mind. Patient's Goal: Pt will be able to share a personal experience of anxiety and identify feelings below the surface of their anxiety. Pt will verbalize the importance of focusing efforts on addressing the real reasons for their discomfort. Pt will be able to demonstrate using the wind mind technique to address their anxiety example. Notes:  Pt was an active participant in group discussion. Pt easily engages with others, shares personal experiences, and is open to learning new information. Pt was able to demonstrate adequate understanding of techniques discussed. Status After Intervention:  Improved    Participation Level:  Active Listener and Interactive    Participation Quality: Appropriate, Attentive, Sharing and Supportive      Speech:  normal      Thought Process/Content: Logical      Affective Functioning: Congruent      Mood: euthymic      Level of consciousness:  Alert, Oriented x4 and Attentive      Response to Learning: Able to verbalize current knowledge/experience, Able to verbalize/acknowledge new learning, Able to retain information, Capable of insight, Able to change behavior and Progressing to goal      Endings: None Reported    Modes of Intervention: Education, Support, Socialization, Exploration, Problem-solving and Activity      Discipline Responsible: /Counselor      Signature:  ROSANA Hook

## 2021-11-30 NOTE — BH NOTE
PSYCHIATRY ATTENDING NOTE    CC: \"I'm feeling good. \"    S: [facility] being [facility] dual diagnosis IOP in follow-up for stimulant use disorder. No medication changes made last appointment. Met with patient today to discuss progress with treatment. [facility] reports things are going well overall. 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 Patient is still living at the sober house and reports it has not been a supportive environment there due to people drinking and using drugs. He is still on a waiting list for housing through [facility] and in the meantime may stay with family. Otherwise no reported issues and we discussed timeline for discharge and transition to after-care. MSE:  male appears age. Pleasant, cooperative, forthcoming. Normal psychomotor activity, gait, strength, tone, eye contact. Mood euthymic. Affect flexible. Speech clear. Thought process organized. Content future-oriented. No suicidal or homicidal ideations. No paranoia, delusions or hallucinations. Orientation, concentration, recent and remote memory are grossly intact. Fund of knowledge fair. Language use fair. Insight and judgment fair. MEDICATIONS:  None    ASSESSMENT:  Stimulant Use Disorder     History of drug-induced psychosis    PLAN: Continue IOP with tentative discharge to after-care scheduled for Thursday 12/9/21.  Discuss with team.                           Electronically signed by Jaylen Colon MD on 11/30/2021 at 12:38 PM

## 2021-11-30 NOTE — PROGRESS NOTES
Group Therapy Note    Date: 11/30/2021  Start Time:11:15  End Time:  12:00  Number of Participants: 6    Type of Group: Psychoeducation    Wellness Binder Information  Module Name: Leisure activity    Patient's Goal: To understand the importance of leisure activity in wellness recovery. Notes: Attended group and was an active participant in the activity as well. Status After Intervention:  Improved    Participation Level:  Active Listener and Interactive    Participation Quality: Attentive and Sharing      Speech:  hesitant      Thought Process/Content: Linear      Affective Functioning: Flat      Mood: depressed      Level of consciousness:  Alert      Response to Learning: Progressing to goal      Endings: None Reported    Modes of Intervention: Education and Support      Discipline Responsible: Psychoeducational Specialist      Signature:  THOMAS Heard

## 2021-11-30 NOTE — PROGRESS NOTES
Group Therapy Note    Date: 11/30/2021  Start Time: 830  End Time:  945  Number of Participants: 6    Type of Group: Psychotherapy    Patient's Goal:  To increase socialization and improve interpersonal relationships. Notes:  Pt was an active participant in group focusing on relationships we have with others. Pt shared that he feels really good today due to the fact that he went with some people he knows and was able to exercise and do yoga with them. He feels that this helped to boost his mood. He states that he is still having difficulties with staying in his sober living house, but that this activity helped. Pt was able to show support to others and engage in conversation. He related well with others and was receptive to feedback. Status After Intervention:  Improved    Participation Level:  Active Listener and Interactive    Participation Quality: Appropriate, Attentive, Sharing and Supportive      Speech:  normal      Thought Process/Content: Logical  Linear      Affective Functioning: Blunted      Mood: anxious and euthymic      Level of consciousness:  Alert, Oriented x4 and Attentive      Response to Learning: Able to verbalize current knowledge/experience, Capable of insight and Progressing to goal      Endings: None Reported    Modes of Intervention: Support, Socialization and Exploration      Discipline Responsible: /Counselor     Mary Beth Rodriguez Intern    Signature:  ROSANA Calvillo

## 2021-12-01 NOTE — CARE COORDINATION
Name: Ceci Reynaldoneo  Diagnostic Impression    Scale: DSM-V Diagnosis Code   No diagnosis                    0-1     Mild JENNIFER                          2-3     Moderate JENNIFER                 4-5     Severe JENNIFER                      6+ Stimulant Use Disorder (Methamphetamine), Severe    Other factors: Unspecified Trauma and Stressor Related Disorder             Criteria symptoms   A problematic pattern of substance use leading to clinically significant impairment or distress, as manifested by at least two of the following, occurring within a 12-month period. [x] Taken in larger amounts or over longer time than intended. [x] Persistent desire or unsuccessful efforts to cut down/control use. [x] Great deal of time spent obtaining , using, and recovering from effects. [x] Craving or strong desire to use. [x] Recurrent use resulting in failure to fulfill major role obligations at work; school, or home. [x] Continued use despite persistent or recurrent social/interpersonal problems caused or exacerbated by effects. [x] Giving up important social, occupational or recreational activities because of use. [x] Recurrent use in situations in which it is physically hazardous. [x] Continued use despite knowledge of persistent or recurrent physical or psychological problem likely caused/exacerbated by use. [x] Tolerance as defined by either:  · Need for increased amounts to achieve intoxication or desired effects. · Diminished effect with continued use of the same amount.      [x] Withdrawal as manifested by either:  · The characteristic withdrawl symptoms  · Using to relieve or avoid withdrawal symptoms          Signed: Heidi Gillis, [facility]     12/1/2021

## 2021-12-01 NOTE — PLAN OF CARE
[facility]- Level of Care Placement      [x]Admissions  []Continued Stay []Discharge/Transfer / Complication in [address] [id number]:[id number]    Client Sticker      Level of Care Level 1      Outpatient Services Level 2.1   Intensive Outpatient Services(IOP) Level 2.5   Partial Hospitalization Services Level 3.1 CLINICALLY Managed Low-Intensity Residential Services Level 3.3  CLINICALLY Managed Population- Specific High- Intensity Residential Services Level 3.5  CLINICALLY Managed High Intensive Residential Services Level 3.7  MEDICALLY Monitored Intensive Inpatient Services Level 4  MEDICALLY Managed Intensive Inpatient Services   Dimension 1  Acute Intoxication and/or Withdrawal Potential [x] Not experiencing significant withdrawal    [] Minimal  risk of severe  withdrawal [] Minimal  risk of severe  withdrawal    [] Manageable  at Level 2-WM [] Moderate  risk of severe withdrawal    [] Manageable at Level 2-WM [] No withdrawal risk or minimal or stable withdrawal     [] Concurrently receiving Level -WM or Level 2-WM services [] Minimal risk of severe withdrawal    [] If withdrawal is present, manageable at Level 3. 2-WM  [] Minimal risk of severe withdrawal    [] If withdrawal is present manageable at Level 3. 2-WM [] High risk of withdrawal, but manageable at Level  3.7-WM and does not require  full resources of a licensed hospital [] At high risk of withdrawal and requires Level 4-WM and full resources of licensed hospital    COMMENTS:           Dimension 2   Biomedical Conditions and Complications  (BMC/C) [] None or very stable    [] Receiving concurrent medical monitoring  [] None or not a distraction from treatment    [x] Problems are manageable at Level 2.1 [] None or not sufficient to distract from treatment    [] Problems are manageable at  Level 2.5 [] None or stable    [] Receiving concurrent medical monitoring  [] None or stable    [] Receiving concurrent medical monitoring  [] None or stable    [] Receiving concurrent medical monitoring [] Requires 24-hour medical monitoring  but not intensive treatment [] Requires 24-hour medical & nursing care and the full  resources of a licensed hospital   COMMENTS:           Dimension 3  Emotional,  Behavioral,  or Cognitive Conditions and Complications   (EBC/C) [] None or very stable    [] Receiving concurrent mental health monitoring [x] Mild severity  with potential to distract from recovery; needs monitoring [] Mild to moderate severity, with potential to distract from recovery, needs stabilization [] None or minimal; not distracting to recovery    [] If  stable, a    co-occurring capable program is appropriate    [] If not stable, a co-occurring enhanced program is required [] Mild to moderate severity; needs structure to focus on recovery. Treatment should be designed to address significant cognitive deficits     [] If  stable, a  co-occurring capable program is appropriate    [] If not stable, a co-occurring enhanced program is required [] Demonstrates repeated inability to control impulses or unstable & dangerous signs/ symptoms require stabilization. Other functional deficits require stabilization and 24-hr.  setting to prepare for community integration  & continuing care    [] Co-occurring enhanced setting is required for those with severe & chronic mental illness [] Moderate severity;  needs 24-hour   structured setting    [] If client has co-occurring mental disorder, requires concurrent mental health services in a medically monitored setting  [] Severe and unstable problems;  requires 24-hour psychiatric care  with concomitant addiction treatment   COMMENTS:             Staff: Yun Umanzor____________________________  Date:12/1/2021____________                   [address] Placement      [x]Admissions  []Continued Stay []Discharge/Transfer / Complication in [place] Date:12/1/2021    Client Sticker      Level of Care Level 1  Outpatient   Services Level 2.1  Intensive  Outpatient  Services Level 2.5  Partial Hospitalization Services Level 3.1  CLINICALLY Managed Low-intensity Residential Services Level 3.3  CLINICALLY Managed Population- Specific High- Intensity Residential Services Level 3.5  CLINICALLY Managed High-Intensive Residential Services Level 3.7  MEDICALLY Monitored Intensive Inpatient Services Level 4  MEDICALLY Managed Intensive Inpatient Services   Dimension 4  Readiness  To  Change [] Ready for recovery but needs motivating and monitoring strategies to strengthen readiness    []Needs ongoing monitoring and disease management    [] High severity in this dimension but not in other dimensions. Needs Level 1 motivational enhancement strategies   [] Has variable engagement in treatment, ambivalence, or lack of awareness of substance use or mental health problems and requires structured program several times/wk. to promote progress through stages of change [x] Has poor engagement in treatment, significant ambivalence, or lack of awareness of substance use or mental health problems, requires near daily structured program or intensive engagement to promote progress through stages of change [] Open to recovery, but needs structured environment to maintain therapeutic gains [] Has little awareness & needs interventions at Level 3.3 to engage & stay in treatment.     [] If there is high severity in this dimension, but not in any other dimension, motivational enhancement strategies should be provided in Level 1 [] Has marked difficulty with, or opposition to treatment with dangerous consequences    [] If there is high severity in this dimension, but not in any other dimension, motivational enhancement strategies should be provided in Level 1 [] Low interest in treatment and impulse control is poor despite negative consequences;  needs motivating strategies only safely available in 24-hour structured setting    [] If there is high severity in this dimension, but not in any other dimension, motivational enhancement strategies should be provided in Level 1 [] Problems in this dimension do not qualify the client for Level 4 services    [] If the client's only severity is in Dimension 4,5, and/or 6 without high severity in Dimensions 1,2, and/or 3, then client is not qualified for Level 4   COMMENTS:           Dimension 5  Relapse, Continued Use or Continued Problem Potential  [] Able to maintain abstinence or control use and/or addictive behaviors  and pursue recovery or motivational goals with minimal support [] Intensification of addiction or mental health symptoms indicate high likelihood of relapse risk or continued use or continued problems without  close monitoring and support several times/wk.  [] Intensification of addiction or mental health symptoms, despite active participation in a Level 1 or 2.1 program, indicates high likelihood of relapse or continued use or continued problems without near-daily monitoring [x] Understands relapse but needs structure to maintain therapeutic gains  [] Has little awareness and needs interventions available only at Level 3.3 to prevent continued use, with imminent dangerous consequences, because of cognitive deficits or  comparable dysfunction  [] Has no recognition  of the skills needed to prevent continued use,  with imminently dangerous  consequences [] Unable to control use, with imminently dangerous consequences,  despite active participation at less intensive levels of care [] Problems in this dimension do not qualify the client for Level 4 services    [] If the client's only severity is in Dimension 4,5, and/or 6 without high severity in Dimensions 1,2, and/or 3, then client is not qualified for Level 4   COMMENTS:           Dimension 6  Recovery/Living Environment []  Recovery environment is supportive and/or the client has skills to cope [] Recovery environment is not supportive  but with structure and support, the client can cope [x] Recovery environment is not supportive, but with structure and support and relief from the home environment, client can cope [] Environment    is dangerous, but recovery is achievable if Level 3.1 24-hour structure is available  [] Environment is dangerous and  client needs 24-hour structure to learn to cope [] Environment is dangerous, and the client lacks skills to cope outside of a  highly structured 24-hour setting   [] Environment is dangerous, and the client lacks skills to cope outside of a  highly structured 24-hour setting [] Problems in this dimension do not qualify the client for Level 4 services    [] If the client's only severity is in Dimension 4,5, and/or 6 without high severity in Dimensions 1,2, and/or 3, then client is not qualified for Level 4   COMMENTS:               Staff: Toni Umanzor____________________________  Date:12/1/2021____________

## 2021-12-02 ENCOUNTER — HOSPITAL ENCOUNTER (OUTPATIENT)
Dept: PSYCHIATRY | Age: 41
Setting detail: THERAPIES SERIES
Discharge: HOME | End: 2021-12-02
Payer: COMMERCIAL

## 2021-12-02 PROCEDURE — H2020 THER BEHAV SVC, PER DIEM: HCPCS

## 2021-12-02 NOTE — PROGRESS NOTES
Date:  12/2/2021  Start Time:  8:45 AM  End Time:  10:15 AM  Number of participants: 5  Type of group: Psychotherapy  Mode of intervention: Support, Socialization, Exploration and Clarifying  Topic: Recovery Challenges  Objective: To increase socialization and improve interpersonal relationships. To discuss positive ways of coping with mental health, recovery, and relationship challenges     Note:  Patients shared their five-category assessment (body, mind, emotions, spirit, relationships), goal for the group, and progress they have made related to objectives. Patients shared recent stressors and triggers and how they coped and responded to them. He said he was struggling because his friend had just overdosed and he had been  Negatively emotionally triggered by a conversation he had with an old friend. Patient provided support to peers and was receptive to feedback. Status after intervention:Improved  Participation level:  Active Listener and Interactive  Participation Quality: Appropriate, Attentive, Sharing and Supportive  Speech: normal  Thought Process/Content:Logical  Linear  Mood/Affect: congruent  Self report: None Reported  Response to learning: Able to verbalize current knowledge/experience, Able to verbalize/acknowledge new learning and Capable of insight  Discipline Responsible: /Counselor    Electronically signed by Garry Pate [facility] on 12/2/2021 at 2:28 PM

## 2021-12-02 NOTE — PROGRESS NOTES
Date:  12/2/2021  Start Time:  10:30 AM  End Time:  12:00 PM  Number of participants: 5  Type of group: Recovery  Mode of intervention: Education, Support, Socialization, Problem-solving and Activity  Topic:   Happiest Day Writing Therapy Prompt  Objective: To utilize the happiest day prompt as a way to shift focus away from negative thoughts and events and improve mood       Note:  Patients discussed the benefits of writing therapy and shared their experiences completing writing therapy prompts. Patients wrote about their happiest day or days and shared them with the group. Patient shared how he helped take care of his mother when she was ill. Patient provided support to peers and was receptive to feedback.     Status after intervention:Improved  Participation level:  Active Listener and Interactive  Participation Quality: Appropriate, Attentive, Sharing and Supportive  Speech: normal  Thought Process/Content:Logical  Linear  Mood/Affect: congruent  Self report: None Reported  Response to learning: Able to verbalize current knowledge/experience, Able to verbalize/acknowledge new learning and Capable of insight  Discipline Responsible: /Counselor    Electronically signed by Merle Batista [facility] on 12/2/2021 at 2:36 PM

## 2021-12-06 ENCOUNTER — HOSPITAL ENCOUNTER (OUTPATIENT)
Dept: PSYCHIATRY | Age: 41
Setting detail: THERAPIES SERIES
Discharge: HOME | End: 2021-12-06
Payer: COMMERCIAL

## 2021-12-06 PROCEDURE — H2020 THER BEHAV SVC, PER DIEM: HCPCS

## 2021-12-06 NOTE — PROGRESS NOTES
Date:  12/6/2021  Start Time:  10:30 AM  End Time:  12:00 PM  Number of participants: 4  Type of group: Recovery  Mode of intervention: Education, Support, Exploration, Problem-solving and Confrontation  Topic: Personal Recovery Experience from peer supporter, addiction and recovery education, and 12-step program information  Objective: To learn about the recovery experience through a peer, discuss personal struggles, and to ask questions related to addiction and recovery    Note:  Patients listened to a peer in recovery share their addiction and recovery experience. She shared how she and her 's alcohol use progressed to crack cocaine use, but how they were able to get treatment and make positive changes to reconcile their relationships and recover. She shared how the help of friends, inpatient treatment, sober living, changing locations, and involvement in a 12-step program helped their recovery. She also shared how becoming a peer supporter has been rewarding. Patients asked questions and received feedback. Status after intervention:Improved  Participation level:  Active Listener and Interactive  Participation Quality: Appropriate and Attentive  Speech: normal  Thought Process/Content:Logical  Linear  Mood/Affect: congruent  Self report: None Reported  Response to learning: Able to verbalize current knowledge/experience, Able to verbalize/acknowledge new learning and Capable of insight  Discipline Responsible: /Counselor     Electronically signed by Aleida Tai, [facility] on 12/6/2021 at 2:36 PM

## 2021-12-06 NOTE — PROGRESS NOTES
Date:  12/6/2021  Start Time:  8:45 AM  End Time:  10:15 AM  Number of participants:  4   Type of group: Psychotherapy  Mode of intervention: Education, Support, Socialization, Exploration, Clarifying, Problem-solving and Activity   Topic:  Container visualization and safe place visualization   Objective: To increase socialization and improve interpersonal relationships. Complete a container visualization related to rumination or trauma. Note:  Patients shared their five-category assessment (body, mind, emotions, spirit, relationships) and goal for the group. Patients shared recent stressors and triggers and how they coped and responded to them. The group provided feedback. Patients created a visual container to store disturbing material that is preventing them from fully engaging in the present as a tool to control intrusive thoughts and images. They also created a safe place to retreat to when they are feeling stressed or anxious      Status after intervention:Improved  Participation level:  Active Listener and Interactive  Participation Quality: Appropriate, Attentive and Sharing  Speech: normal  Thought Process/Content:Logical  Linear  Mood/Affect: congruent  Self report: None Reported  Response to learning: Able to verbalize current knowledge/experience, Able to verbalize/acknowledge new learning and Capable of insight  Discipline Responsible: /Counselor     Electronically signed by EMILY Hernandez on 12/6/2021 at 2:21 PM

## 2021-12-09 ENCOUNTER — HOSPITAL ENCOUNTER (OUTPATIENT)
Dept: PSYCHIATRY | Age: 41
Setting detail: THERAPIES SERIES
End: 2021-12-09
Payer: COMMERCIAL

## 2021-12-09 ENCOUNTER — TELEPHONE (OUTPATIENT)
Dept: FAMILY MEDICINE CLINIC | Age: 41
End: 2021-12-09

## 2021-12-09 NOTE — TELEPHONE ENCOUNTER
[facility] phoned stated that they need doctor to addend a office note for patient to get his ensure   They need a reason for the ensure so patients insurance can cover it  Please advise   Thank you April

## 2021-12-10 ENCOUNTER — NURSE ONLY (OUTPATIENT)
Dept: FAMILY MEDICINE CLINIC | Age: 41
End: 2021-12-10
Payer: COMMERCIAL

## 2021-12-10 ENCOUNTER — HOSPITAL ENCOUNTER (OUTPATIENT)
Dept: MRI IMAGING | Age: 41
Discharge: HOME | End: 2021-12-12
Payer: COMMERCIAL

## 2021-12-10 DIAGNOSIS — Z21 ASYMPTOMATIC HIV INFECTION, WITH NO HISTORY OF HIV-RELATED ILLNESS (HCC): ICD-10-CM

## 2021-12-10 DIAGNOSIS — D18.03 LIVER HEMANGIOMA: ICD-10-CM

## 2021-12-10 LAB
ANION GAP SERPL CALCULATED.3IONS-SCNC: 7 MMOL/L (ref 7–16)
BUN BLDV-MCNC: 11 MG/DL (ref 6–20)
CALCIUM SERPL-MCNC: 9.6 MG/DL (ref 8.6–10.2)
CHLORIDE BLD-SCNC: 104 MMOL/L (ref 98–107)
CO2: 27 MMOL/L (ref 22–29)
CREAT SERPL-MCNC: 1.2 MG/DL (ref 0.7–1.2)
GFR AFRICAN AMERICAN: >60
GFR NON-AFRICAN AMERICAN: >60 ML/MIN/1.73
GLUCOSE BLD-MCNC: 97 MG/DL (ref 74–99)
POTASSIUM SERPL-SCNC: 4.5 MMOL/L (ref 3.5–5)
SODIUM BLD-SCNC: 138 MMOL/L (ref 132–146)

## 2021-12-10 PROCEDURE — 6360000004 HC RX CONTRAST MEDICATION: Performed by: RADIOLOGY

## 2021-12-10 PROCEDURE — 36415 COLL VENOUS BLD VENIPUNCTURE: CPT | Performed by: FAMILY MEDICINE

## 2021-12-10 PROCEDURE — A9581 GADOXETATE DISODIUM INJ: HCPCS | Performed by: RADIOLOGY

## 2021-12-10 PROCEDURE — 74183 MRI ABD W/O CNTR FLWD CNTR: CPT

## 2021-12-10 RX ADMIN — GADOXETATE DISODIUM 10 ML: 181.43 INJECTION, SOLUTION INTRAVENOUS at 12:59

## 2021-12-13 ENCOUNTER — HOSPITAL ENCOUNTER (OUTPATIENT)
Dept: PSYCHIATRY | Age: 41
Setting detail: THERAPIES SERIES
Discharge: HOME | End: 2021-12-13
Payer: COMMERCIAL

## 2021-12-13 PROCEDURE — H2020 THER BEHAV SVC, PER DIEM: HCPCS

## 2021-12-13 NOTE — PROGRESS NOTES
Date:  12/13/2021  Start Time:  10:30 AM  End Time:  12:00 PM  Number of participants:  4  Type of group: Recovery  Mode of intervention: Education, Exploration, Problem-solving and Activity  Topic:  Mindfulness   Objective: To learn about the benefits of mindfulness and practice mindfulness exercises       Note:  Patients learned about the characteristics and benefits of mindfulness for their mental health and recovery. They shared their challenges related to mindfulness. They practiced a 5 senses mindfulness activity and also visualized what their senses would experience on a mindful walk. They read and discussed quotes related to benefits of being present and mindful in the here and now. Patient discussed other mindful activities such as eating and driving. Patient practiced and processed a mindfulness meditation for cravings and addiction. Patient participated in mindfulness activities and in group discussion. However, he had difficulty staying focused and was easily distracted.             Status after intervention:Improved  Participation level: Interactive and Monopolizing  Participation Quality: Inappropriate and Intrusive  Speech: pressured  Thought Process/Content:Flight of ideas  Mood/Affect: spontaneous  Self report: None Reported  Response to learning: Able to verbalize current knowledge/experience  Discipline Responsible: /Counselor     Electronically signed by Jennifer Benoit [facility] on 12/13/2021 at 4:48 PM

## 2021-12-13 NOTE — PROGRESS NOTES
Date:  12/13/2021  Start Time:  8:45 AM  End Time:  10:15 AM  Number of participants:  4  Type of group: Psychotherapy  Mode of intervention: Support, Socialization and Exploration  Topic: Family Dynamics and Personal Responsibility  Objective: To increase socialization and improve interpersonal relationships. To address issues of personal responsibility in families and gratitude       Note:  Patients shared progress and explored thoughts and feelings related to stressors. 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 He also was enjoying living with his grandmother. He was supportive to others and helped them reframe their problems and see them from another perspective. However, he was glamorizing marijuana use. Status after intervention:Improved  Participation level:  Active Listener and Interactive  Participation Quality: Appropriate, Attentive, Sharing and Supportive  Speech: normal  Thought Process/Content:Linear  Mood/Affect: spontaneous  Self report: None Reported  Response to learning: Able to verbalize current knowledge/experience and Capable of insight  Discipline Responsible: /Counselor     Electronically signed by Nilesh Johnson [facility] on 12/13/2021 at 4:46 PM

## 2021-12-14 ENCOUNTER — TELEPHONE (OUTPATIENT)
Dept: FAMILY MEDICINE CLINIC | Age: 41
End: 2021-12-14

## 2021-12-14 NOTE — TELEPHONE ENCOUNTER
The lab called over and they are stating that they can not run the HIV Rna on Pint Please Fan due to no yellow top tube sent. Can you please redraw.     Thank you

## 2021-12-16 ENCOUNTER — APPOINTMENT (OUTPATIENT)
Dept: PSYCHIATRY | Age: 41
End: 2021-12-16
Payer: COMMERCIAL

## 2021-12-20 ENCOUNTER — HOSPITAL ENCOUNTER (OUTPATIENT)
Dept: PSYCHIATRY | Age: 41
Setting detail: THERAPIES SERIES
End: 2021-12-20
Payer: COMMERCIAL

## 2021-12-21 ENCOUNTER — TELEPHONE (OUTPATIENT)
Dept: PSYCHIATRY | Age: 41
End: 2021-12-21

## 2021-12-22 ENCOUNTER — OFFICE VISIT (OUTPATIENT)
Dept: FAMILY MEDICINE CLINIC | Age: 41
End: 2021-12-22
Payer: COMMERCIAL

## 2021-12-22 VITALS
WEIGHT: 216 LBS | OXYGEN SATURATION: 96 % | SYSTOLIC BLOOD PRESSURE: 110 MMHG | RESPIRATION RATE: 16 BRPM | BODY MASS INDEX: 33.9 KG/M2 | HEART RATE: 98 BPM | DIASTOLIC BLOOD PRESSURE: 63 MMHG | HEIGHT: 67 IN | TEMPERATURE: 99.2 F

## 2021-12-22 DIAGNOSIS — B35.6 TINEA CRURIS: ICD-10-CM

## 2021-12-22 DIAGNOSIS — K21.9 GASTROESOPHAGEAL REFLUX DISEASE, UNSPECIFIED WHETHER ESOPHAGITIS PRESENT: ICD-10-CM

## 2021-12-22 DIAGNOSIS — J45.40 MODERATE PERSISTENT ASTHMA WITHOUT COMPLICATION: ICD-10-CM

## 2021-12-22 DIAGNOSIS — K46.9 ABDOMINAL HERNIA WITHOUT OBSTRUCTION AND WITHOUT GANGRENE, RECURRENCE NOT SPECIFIED, UNSPECIFIED HERNIA TYPE: ICD-10-CM

## 2021-12-22 DIAGNOSIS — L40.9 PSORIASIS: Primary | ICD-10-CM

## 2021-12-22 DIAGNOSIS — Z21 ASYMPTOMATIC HIV INFECTION, WITH NO HISTORY OF HIV-RELATED ILLNESS (HCC): ICD-10-CM

## 2021-12-22 PROCEDURE — G8482 FLU IMMUNIZE ORDER/ADMIN: HCPCS | Performed by: FAMILY MEDICINE

## 2021-12-22 PROCEDURE — G8427 DOCREV CUR MEDS BY ELIG CLIN: HCPCS | Performed by: FAMILY MEDICINE

## 2021-12-22 PROCEDURE — 4004F PT TOBACCO SCREEN RCVD TLK: CPT | Performed by: FAMILY MEDICINE

## 2021-12-22 PROCEDURE — 99213 OFFICE O/P EST LOW 20 MIN: CPT | Performed by: FAMILY MEDICINE

## 2021-12-22 PROCEDURE — G8417 CALC BMI ABV UP PARAM F/U: HCPCS | Performed by: FAMILY MEDICINE

## 2021-12-22 PROCEDURE — 99212 OFFICE O/P EST SF 10 MIN: CPT | Performed by: FAMILY MEDICINE

## 2021-12-22 RX ORDER — PANTOPRAZOLE SODIUM 40 MG/1
40 TABLET, DELAYED RELEASE ORAL
Qty: 90 TABLET | Refills: 1 | Status: SHIPPED
Start: 2021-12-22 | End: 2022-03-29 | Stop reason: SDUPTHER

## 2021-12-22 RX ORDER — BUDESONIDE AND FORMOTEROL FUMARATE DIHYDRATE 160; 4.5 UG/1; UG/1
2 AEROSOL RESPIRATORY (INHALATION) 2 TIMES DAILY
Qty: 30.6 G | Refills: 1 | Status: SHIPPED
Start: 2021-12-22 | End: 2022-03-29 | Stop reason: SDUPTHER

## 2021-12-22 RX ORDER — OMEPRAZOLE 20 MG/1
20 CAPSULE, DELAYED RELEASE ORAL NIGHTLY
Qty: 30 CAPSULE | Status: CANCELLED | OUTPATIENT
Start: 2021-12-22

## 2021-12-22 RX ORDER — NICOTINE 21 MG/24HR
1 PATCH, TRANSDERMAL 24 HOURS TRANSDERMAL DAILY
Qty: 30 PATCH | Refills: 3 | Status: SHIPPED
Start: 2021-12-22 | End: 2022-03-29 | Stop reason: SDUPTHER

## 2021-12-22 RX ORDER — POLYETHYLENE GLYCOL 3350 17 G/17G
17 POWDER, FOR SOLUTION ORAL DAILY
Qty: 510 G | Refills: 0 | Status: SHIPPED | OUTPATIENT
Start: 2021-12-22 | End: 2022-01-21

## 2021-12-22 RX ORDER — ALBUTEROL SULFATE 90 UG/1
2 AEROSOL, METERED RESPIRATORY (INHALATION) EVERY 6 HOURS PRN
Qty: 18 G | Refills: 2 | Status: SHIPPED
Start: 2021-12-22 | End: 2022-03-03 | Stop reason: SDUPTHER

## 2021-12-22 RX ORDER — PETROLATUM 42 G/100G
OINTMENT TOPICAL
Qty: 228 G | Refills: 0 | Status: SHIPPED
Start: 2021-12-22 | End: 2022-03-29 | Stop reason: SDUPTHER

## 2021-12-22 RX ORDER — BICTEGRAVIR SODIUM, EMTRICITABINE, AND TENOFOVIR ALAFENAMIDE FUMARATE 50; 200; 25 MG/1; MG/1; MG/1
1 TABLET ORAL NIGHTLY
Qty: 30 TABLET | Refills: 2 | Status: SHIPPED
Start: 2021-12-22 | End: 2022-03-29 | Stop reason: SDUPTHER

## 2021-12-22 RX ORDER — CALORIC SUPPLEMENT
1 LIQUID (ML) ORAL 3 TIMES DAILY
Qty: 100 EACH | Refills: 2 | Status: CANCELLED | OUTPATIENT
Start: 2021-12-22

## 2021-12-22 RX ORDER — CLOBETASOL PROPIONATE 0.5 MG/G
CREAM TOPICAL 2 TIMES DAILY PRN
Qty: 30 G | Refills: 2 | Status: SHIPPED
Start: 2021-12-22 | End: 2022-03-29 | Stop reason: SDUPTHER

## 2021-12-22 RX ORDER — NYSTATIN 100000 U/G
CREAM TOPICAL
Qty: 30 G | Refills: 0 | Status: SHIPPED
Start: 2021-12-22 | End: 2022-03-29 | Stop reason: SDUPTHER

## 2021-12-22 RX ORDER — ALBUTEROL SULFATE 2.5 MG/3ML
2.5 SOLUTION RESPIRATORY (INHALATION)
Qty: 120 EACH | Refills: 0 | Status: SHIPPED
Start: 2021-12-22 | End: 2022-03-29 | Stop reason: SDUPTHER

## 2021-12-22 NOTE — PROGRESS NOTES
whether esophagitis present  pantoprazole (PROTONIX) 40 MG tablet   6. Abdominal hernia without obstruction and without gangrene, recurrence not specified, unspecified hernia type  US SOFT TISSUE LIMITED AREA       ***      Follow up:  Return in about 4 weeks (around 1/19/2022) for review test results.       Teresa Cardoso MD PGY-3    Discussed with: Dr. Gigi Greenberg

## 2021-12-22 NOTE — PROGRESS NOTES
[facility]  FAMILY MEDICINE RESIDENCY PROGRAM  DATE OF VISIT : 2021    Patient : Jane Benedict   Age : 39 y.o.  : 1980   MRN : <[id number]>   ______________________________________________________________________    Chief Complaint :   Chief Complaint   Patient presents with    Results       HPI : Jane Benedict is 39 y.o. male who presented to the clinic today for above chief complaint. Asthma:   Current medications include Symbicort and albuterol as needed.  Has had decreased use of albuterol since initiation of Symbicort. 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 Does continue to smoke.  Not under the care of pulmonology.     HIV:   Well-controlled on Biktarvy. Viral load drawn and pending in the lab. Patient reports few missed doses of Biktarvy while moving from one housing to another, has been taking consistently since.  Last CD4 count checked in hospitalization was above 1000.  No fevers, chills, night sweats.     GERD:  Patient currently on PPI and H2 blocker combo therapy. Is under the care of GI. Is to be having upper and lower scopes in January. Patient also on MiraLAX. Patient reports some occasional left lower quadrant pain associated with bending and twisting. Bowels are moving regularly, no blood in his stool. No nausea or vomiting.     Psoriasis:  Requesting refill of creams. Patient previously under the care of dermatology in [address] though not established here.   Patient with multiple plaques along the lower extremities.           Past Medical History :  Past Medical History:   Diagnosis Date    Asthma     GERD (gastroesophageal reflux disease)     HIV (human immunodeficiency virus infection) ([facility] [address]     Psoriasis      Past Surgical History:   Procedure Laterality Date    OTHER SURGICAL HISTORY      patient states he went to surgery to have a boil removed          Review of Systems :    ROS - Per HPI       ______________________________________________________________________    Physical Exam :    Wt Readings from Last 3 Encounters:   12/22/21 216 lb (98 kg)   11/05/21 221 lb (100.2 kg)   10/25/21 220 lb (99.8 kg)       BMI Readings from Last 3 Encounters:   12/22/21 33.83 kg/m²   11/05/21 34.61 kg/m²   10/25/21 34.46 kg/m²   ]      Vitals: /63   Pulse 98   Temp 99.2 °F (37.3 °C) (Temporal)   Resp 16   Ht 5' 7\" (1.702 m)   Wt 216 lb (98 kg)   SpO2 96%   BMI 33.83 kg/m²     Physical Exam  Constitutional:       General: He is not in acute distress. Appearance: He is well-developed. HENT:      Head: Normocephalic and atraumatic. Eyes:      Conjunctiva/sclera: Conjunctivae normal.      Pupils: Pupils are equal, round, and reactive to light. Neck:      Thyroid: No thyromegaly. Trachea: No tracheal deviation. Cardiovascular:      Rate and Rhythm: Normal rate and regular rhythm. Heart sounds: Normal heart sounds. No murmur heard. Pulmonary:      Effort: Pulmonary effort is normal. No respiratory distress. Breath sounds: No wheezing or rales. Comments: Good air movement  Abdominal:      General: Bowel sounds are normal. There is no distension. Palpations: Abdomen is soft. Tenderness: There is no abdominal tenderness (mild over epigastrium, RUQ). There is no guarding or rebound. Hernia: A hernia (LLQ bulge with cough) is present. Musculoskeletal:         General: Normal range of motion. Cervical back: Normal range of motion and neck supple. Lymphadenopathy:      Cervical: No cervical adenopathy. Skin:     General: Skin is warm and dry. Capillary Refill: Capillary refill takes less than 2 seconds. Findings: No rash. Neurological:      Mental Status: He is alert and oriented to person, place, and time. Cranial Nerves: No cranial nerve deficit.       Deep Tendon Reflexes: Reflexes normal.   Psychiatric: Behavior: Behavior normal.         ______________________________________________________________________    Assessment & Plan :     Diagnosis Orders   1. Psoriasis  Luong Aamir, DO, Dermatology, [place] (PATI)   2. Asymptomatic HIV infection, with no history of HIV-related illness (HCC)  bictegravir-emtricitab-tenofovir alafenamide (BIKTARVY) -25 MG TABS per tablet   3. Moderate persistent asthma without complication  budesonide-formoterol (SYMBICORT) 160-4.5 MCG/ACT AERO   4. Tinea cruris  mineral oil-hydrophilic petrolatum (HYDROPHOR) ointment    nystatin (MYCOSTATIN) 454132 UNIT/GM cream   5. Gastroesophageal reflux disease, unspecified whether esophagitis present  pantoprazole (PROTONIX) 40 MG tablet   6. Abdominal hernia without obstruction and without gangrene, recurrence not specified, unspecified hernia type  US SOFT TISSUE LIMITED AREA     HIV: Viral load pending. Likely to be none detected, continue with Biktarvy. Recheck viral load in 3 months. Asthma: Symptoms improved since initiation of Symbicort, continue present management. Psoriasis: Refill creams, referral to dermatology. GERD: Continue with GI for scope tests, continue medication regimen, will obtain records from GI  Left lower quadrant bulge: Concern for hernia, will obtain ultrasound to assess further. Not remarked on MRI of abdomen obtained earlier this month. Signs and symptoms warranting emergent evaluation reviewed with patient. Health maintenance: Encouraged patient to get Covid booster      Follow up:  Return in about 4 weeks (around 1/19/2022) for review test results.        Sheri Nelson MD PGY-3    Discussed with: Dr. Irais Murcia

## 2021-12-22 NOTE — PROGRESS NOTES
Attending Physician Statement    S:   Chief Complaint   Patient presents with    Results      HIV, asthma, GERD. Missed several doses, but tolerating ok. Awaiting viral load   Now on symbicort, doing better than with Qvar   Will get scopes in January from GI  O: Blood pressure 110/63, pulse 98, temperature 99.2 °F (37.3 °C), temperature source Temporal, resp. rate 16, height 5' 7\" (1.702 m), weight 216 lb (98 kg), SpO2 96 %. Exam:   Heart - RRR   Lungs - clear   Abdomen- nontender. Slight bulge in left lower quadrant  A: Possible hernia  P:  US of lower abdomen   Continue other meds, keep f/u with GI   RF meds   Refer to derm for history of psoriasis   Follow-up as ordered    I have discussed the case, including pertinent history and exam findings with the resident. I agree with the documented assessment and plan.

## 2022-01-07 DIAGNOSIS — Z21 ASYMPTOMATIC HIV INFECTION, WITH NO HISTORY OF HIV-RELATED ILLNESS (HCC): Primary | ICD-10-CM

## 2022-03-03 RX ORDER — ALBUTEROL SULFATE 90 UG/1
2 AEROSOL, METERED RESPIRATORY (INHALATION) EVERY 6 HOURS PRN
Qty: 18 G | Refills: 2 | Status: SHIPPED
Start: 2022-03-03 | End: 2022-03-29 | Stop reason: SDUPTHER

## 2022-03-26 ENCOUNTER — APPOINTMENT (OUTPATIENT)
Dept: GENERAL RADIOLOGY | Age: 42
End: 2022-03-26
Payer: COMMERCIAL

## 2022-03-26 ENCOUNTER — HOSPITAL ENCOUNTER (EMERGENCY)
Age: 42
Discharge: HOME | End: 2022-03-26
Payer: COMMERCIAL

## 2022-03-26 VITALS
WEIGHT: 225 LBS | SYSTOLIC BLOOD PRESSURE: 148 MMHG | TEMPERATURE: 98.2 F | OXYGEN SATURATION: 96 % | HEART RATE: 88 BPM | HEIGHT: 67 IN | RESPIRATION RATE: 18 BRPM | BODY MASS INDEX: 35.31 KG/M2 | DIASTOLIC BLOOD PRESSURE: 90 MMHG

## 2022-03-26 DIAGNOSIS — S80.01XA CONTUSION OF RIGHT KNEE, INITIAL ENCOUNTER: ICD-10-CM

## 2022-03-26 DIAGNOSIS — S83.91XA SPRAIN OF RIGHT KNEE, UNSPECIFIED LIGAMENT, INITIAL ENCOUNTER: Primary | ICD-10-CM

## 2022-03-26 DIAGNOSIS — S80.211A KNEE ABRASION, RIGHT, INITIAL ENCOUNTER: ICD-10-CM

## 2022-03-26 PROCEDURE — 73562 X-RAY EXAM OF KNEE 3: CPT

## 2022-03-26 PROCEDURE — 96372 THER/PROPH/DIAG INJ SC/IM: CPT

## 2022-03-26 PROCEDURE — 6360000002 HC RX W HCPCS: Performed by: NURSE PRACTITIONER

## 2022-03-26 PROCEDURE — 99283 EMERGENCY DEPT VISIT LOW MDM: CPT

## 2022-03-26 RX ORDER — HYDROCODONE BITARTRATE AND ACETAMINOPHEN 5; 325 MG/1; MG/1
1 TABLET ORAL EVERY 6 HOURS PRN
Qty: 12 TABLET | Refills: 0 | Status: SHIPPED | OUTPATIENT
Start: 2022-03-26 | End: 2022-03-29

## 2022-03-26 RX ORDER — KETOROLAC TROMETHAMINE 30 MG/ML
30 INJECTION, SOLUTION INTRAMUSCULAR; INTRAVENOUS ONCE
Status: COMPLETED | OUTPATIENT
Start: 2022-03-26 | End: 2022-03-26

## 2022-03-26 RX ORDER — NAPROXEN 500 MG/1
500 TABLET ORAL 2 TIMES DAILY PRN
Qty: 14 TABLET | Refills: 0 | Status: SHIPPED | OUTPATIENT
Start: 2022-03-26 | End: 2022-04-02

## 2022-03-26 RX ADMIN — KETOROLAC TROMETHAMINE 30 MG: 30 INJECTION, SOLUTION INTRAMUSCULAR at 13:45

## 2022-03-26 ASSESSMENT — PAIN DESCRIPTION - DESCRIPTORS: DESCRIPTORS: ACHING;THROBBING

## 2022-03-26 ASSESSMENT — PAIN DESCRIPTION - LOCATION: LOCATION: KNEE

## 2022-03-26 ASSESSMENT — PAIN DESCRIPTION - FREQUENCY: FREQUENCY: CONTINUOUS

## 2022-03-26 ASSESSMENT — PAIN DESCRIPTION - ORIENTATION: ORIENTATION: RIGHT

## 2022-03-26 ASSESSMENT — PAIN SCALES - GENERAL: PAINLEVEL_OUTOF10: 10

## 2022-03-26 ASSESSMENT — PAIN DESCRIPTION - PAIN TYPE: TYPE: ACUTE PAIN

## 2022-03-26 NOTE — Clinical Note
Iraida Sam was seen and treated in our emergency department on 3/26/2022. He may return to work on 03/30/2022. If you have any questions or concerns, please don't hesitate to call.       Vaishali Villegas, JUANA - CNP

## 2022-03-26 NOTE — ED PROVIDER NOTES
[facility]  Department of Emergency Medicine   ED  Encounter Note  Admit Date/RoomTime: 3/26/2022 12:57 PM  ED Room: [facility]    NAME: Kayli Guerra  : 1980  MRN: [id number]     Chief Complaint:  Knee Injury (right knee pain/ swelling xyday; increased pain with while walking' denies chest pain sob n/v/d )    History of Present Illness       Kayli Guerra is a 39 y.o. old male who presents to the emergency department by private vehicle, for traumatic pain located medial aspect and popping sensation to right knee  which occured Leona Pai  day(s) prior to arrival.  The complaint is due to dancing and states he was drinking and dancing at a family  and heard a \"pop\". Since onset the symptoms have been persistent and gradually worsening. Patient has no prior history of pain/injury with regards to today's visit. His pain is aggraveated by any movement, any use of or pressure on or palpation of painful area and relieved by nothing, as no treatment has been provided prior to this visit. His weight bearing ability is: abnormal. He denies any head injury, headache, loss of consciousness, confusion, dizziness, neck pain, chest pain, abdominal pain, back pain, numbness, weakness, blurred vision, nausea, vomiting, fever or chills. Patient does have a history of psoriasis and a rash noted in the lower leg he has a superficial wound abrasion noted in the patellar region with no signs of erythema and scabbing noted. Tetanus Status: up to date. ROS   Pertinent positives and negatives are stated within HPI, all other systems reviewed and are negative. Past Medical History:  has a past medical history of Asthma, GERD (gastroesophageal reflux disease), HIV (human immunodeficiency virus infection) ([facility] [address] and Psoriasis. Surgical History:  has a past surgical history that includes other surgical history ().     Social History:  reports that he has been smoking cigarettes. He has been smoking about 1.00 pack per day. He has never used smokeless tobacco. He reports previous alcohol use. He reports current drug use. Drugs: Methamphetamines (Crystal Meth), Other-see comments, and Marijuana (Rhonda Opoka). Family History: family history includes Arthritis in his brother and mother; Other in his mother; Thyroid Disease in his mother. Allergies: Patient has no known allergies. Physical Exam   Oxygen Saturation Interpretation: Normal.        ED Triage Vitals   BP Temp Temp Source Pulse Resp SpO2 Height Weight   03/26/22 1242 03/26/22 1228 03/26/22 1228 03/26/22 1228 03/26/22 1242 03/26/22 1228 03/26/22 1242 03/26/22 1242   (!) 148/90 98.2 °F (36.8 °C) Oral 91 18 95 % 5' 7\" (1.702 m) 225 lb (102.1 kg)         Constitutional:  Alert, development consistent with age. Neck:  Normal ROM. Supple. Right Knee: medial, patellar            Tenderness:  moderate. Swelling/Effusion: None. Deformity: no deformity observed/palpated. ROM: full range with pain. Skin: Small scabbed abrasion approximately 6 mm in diameter with no surrounding erythema. Psoriatic rash noted to lower tibial region which patient states is chronic and has creams. Drawer's:  Negative. Lachman's: Negative. Apley's: Not Tested. Breezy's: Negative. Valgus/Varus Stress: Comments: pain with stressing. Crepitus: Negative. Hip:            Tenderness:  none. Swelling: None. Deformity: no.              ROM: full range of motion. Skin:  no wounds, erythema, or swelling. Joint(s) Above/Below: hip. Tenderness:  none. Swelling: No.              Deformity: no deformity observed/palpated. ROM: full range of motion. Skin:  no wounds, erythema, or swelling. Neurovascular: Motor deficit: none. Sensory deficit: none. Pulse deficit: none. Capillary refill: normal.  Gait:  limp due to affected limb. Lymphatics: No lymphangitis or adenopathy noted. Neurological:  Oriented. Motor functions intact. Lab / Imaging Results   (All laboratory and radiology results have been personally reviewed by myself)  Labs:  No results found for this visit on 03/26/22. Imaging: All Radiology results interpreted by Radiologist unless otherwise noted. XR KNEE RIGHT (3 VIEWS)   Final Result   No acute osseous findings of the right knee           ED Course / Medical Decision Making     Medications   ketorolac (TORADOL) injection 30 mg (30 mg IntraMUSCular Given 3/26/22 1345)      Re evaluation: 2522 Discussed results. Consult(s):   None    Procedure(s):   none. MDM:     Imaging was obtained based on moderate suspicion for fracture / bony abnormality as per history/physical findings. Patient was medicated with Toradol had some relief but still had pain we will give short course of narcotic for home. Patient advised not to drink alcohol, drive, operate heavy equipment or climb ladders while taking narcotic pain medication. X-ray reveals no acute findings. Patient is afebrile, nontoxic in appearance has no signs of acute septic joint with no erythema or swelling. Did have one small superficial abrasion that was scabbed did not appear to be infected and instructed to put bacitracin to the site and advised on signs and symptoms warranting immediate return. Patient will be given short course of NSAIDs and Norco for discharge. Work excuse also provided. Plan is subsequently for immobilizer device with outpatient follow-up with pcp as instructed in d/c instructions and with on-call orthopaedist as instructed in d/c instructions.       Controlled Substance Monitoring:    Acute and Chronic Pain Monitoring:   RX Monitoring 3/26/2022   Periodic Controlled Substance Monitoring Possible medication side effects, risk of tolerance/dependence & alternative treatments discussed. ;No signs of potential drug abuse or diversion identified. Plan of Care/Counseling:  JUANA Khan CNP reviewed today's visit with the patient in addition to providing specific details for the plan of care and counseling regarding the diagnosis and prognosis. Questions are answered at this time and are agreeable with the plan. Assessment      1. Sprain of right knee, unspecified ligament, initial encounter    2. Knee abrasion, right, initial encounter    3. Contusion of right knee, initial encounter      Plan   Discharged home. Patient condition is good    New Medications     New Prescriptions    HYDROCODONE-ACETAMINOPHEN (NORCO) 5-325 MG PER TABLET    Take 1 tablet by mouth every 6 hours as needed for Pain for up to 3 days. NAPROXEN (NAPROSYN) 500 MG TABLET    Take 1 tablet by mouth 2 times daily as needed for Pain (take with food and full glass of water.)     Electronically signed by JUANA Khan CNP   DD: 3/26/22  **This report was transcribed using voice recognition software. Every effort was made to ensure accuracy; however, inadvertent computerized transcription errors may be present.   END OF ED PROVIDER NOTE      JUANA Khan CNP  03/26/22 2087

## 2022-03-29 ENCOUNTER — HOSPITAL ENCOUNTER (OUTPATIENT)
Age: 42
Discharge: HOME | End: 2022-03-29
Payer: COMMERCIAL

## 2022-03-29 ENCOUNTER — OFFICE VISIT (OUTPATIENT)
Dept: FAMILY MEDICINE CLINIC | Age: 42
End: 2022-03-29
Payer: COMMERCIAL

## 2022-03-29 VITALS
TEMPERATURE: 97.3 F | RESPIRATION RATE: 18 BRPM | OXYGEN SATURATION: 94 % | BODY MASS INDEX: 34.53 KG/M2 | SYSTOLIC BLOOD PRESSURE: 134 MMHG | HEIGHT: 67 IN | WEIGHT: 220 LBS | DIASTOLIC BLOOD PRESSURE: 85 MMHG | HEART RATE: 90 BPM

## 2022-03-29 DIAGNOSIS — M25.561 ACUTE PAIN OF RIGHT KNEE: Primary | ICD-10-CM

## 2022-03-29 DIAGNOSIS — Z72.0 TOBACCO USE: ICD-10-CM

## 2022-03-29 DIAGNOSIS — Z21 ASYMPTOMATIC HIV INFECTION, WITH NO HISTORY OF HIV-RELATED ILLNESS (HCC): ICD-10-CM

## 2022-03-29 DIAGNOSIS — K21.9 GASTROESOPHAGEAL REFLUX DISEASE, UNSPECIFIED WHETHER ESOPHAGITIS PRESENT: ICD-10-CM

## 2022-03-29 DIAGNOSIS — J45.40 MODERATE PERSISTENT ASTHMA WITHOUT COMPLICATION: ICD-10-CM

## 2022-03-29 DIAGNOSIS — L85.3 DRY SKIN: ICD-10-CM

## 2022-03-29 LAB
ALBUMIN SERPL-MCNC: 4.6 G/DL (ref 3.5–5.2)
ALP BLD-CCNC: 73 U/L (ref 40–129)
ALT SERPL-CCNC: 22 U/L (ref 0–40)
ANION GAP SERPL CALCULATED.3IONS-SCNC: 10 MMOL/L (ref 7–16)
AST SERPL-CCNC: 23 U/L (ref 0–39)
BILIRUB SERPL-MCNC: 0.2 MG/DL (ref 0–1.2)
BUN BLDV-MCNC: 11 MG/DL (ref 6–20)
CALCIUM SERPL-MCNC: 9.4 MG/DL (ref 8.6–10.2)
CHLORIDE BLD-SCNC: 105 MMOL/L (ref 98–107)
CO2: 24 MMOL/L (ref 22–29)
CREAT SERPL-MCNC: 1.2 MG/DL (ref 0.7–1.2)
GFR AFRICAN AMERICAN: >60
GFR NON-AFRICAN AMERICAN: >60 ML/MIN/1.73
GLUCOSE BLD-MCNC: 96 MG/DL (ref 74–99)
HCT VFR BLD CALC: 42.1 % (ref 37–54)
HEMOGLOBIN: 13.8 G/DL (ref 12.5–16.5)
MCH RBC QN AUTO: 29.9 PG (ref 26–35)
MCHC RBC AUTO-ENTMCNC: 32.8 % (ref 32–34.5)
MCV RBC AUTO: 91.1 FL (ref 80–99.9)
PDW BLD-RTO: 16 FL (ref 11.5–15)
PLATELET # BLD: 384 E9/L (ref 130–450)
PMV BLD AUTO: 9.1 FL (ref 7–12)
POTASSIUM SERPL-SCNC: 3.8 MMOL/L (ref 3.5–5)
RBC # BLD: 4.62 E12/L (ref 3.8–5.8)
SODIUM BLD-SCNC: 139 MMOL/L (ref 132–146)
TOTAL PROTEIN: 7.9 G/DL (ref 6.4–8.3)
WBC # BLD: 8.1 E9/L (ref 4.5–11.5)

## 2022-03-29 PROCEDURE — G8427 DOCREV CUR MEDS BY ELIG CLIN: HCPCS | Performed by: FAMILY MEDICINE

## 2022-03-29 PROCEDURE — 99213 OFFICE O/P EST LOW 20 MIN: CPT | Performed by: FAMILY MEDICINE

## 2022-03-29 PROCEDURE — 4004F PT TOBACCO SCREEN RCVD TLK: CPT | Performed by: FAMILY MEDICINE

## 2022-03-29 PROCEDURE — 36415 COLL VENOUS BLD VENIPUNCTURE: CPT

## 2022-03-29 PROCEDURE — 80053 COMPREHEN METABOLIC PANEL: CPT

## 2022-03-29 PROCEDURE — G8482 FLU IMMUNIZE ORDER/ADMIN: HCPCS | Performed by: FAMILY MEDICINE

## 2022-03-29 PROCEDURE — 86359 T CELLS TOTAL COUNT: CPT

## 2022-03-29 PROCEDURE — 99212 OFFICE O/P EST SF 10 MIN: CPT | Performed by: FAMILY MEDICINE

## 2022-03-29 PROCEDURE — 87536 HIV-1 QUANT&REVRSE TRNSCRPJ: CPT

## 2022-03-29 PROCEDURE — 85027 COMPLETE CBC AUTOMATED: CPT

## 2022-03-29 PROCEDURE — G8417 CALC BMI ABV UP PARAM F/U: HCPCS | Performed by: FAMILY MEDICINE

## 2022-03-29 PROCEDURE — 86360 T CELL ABSOLUTE COUNT/RATIO: CPT

## 2022-03-29 RX ORDER — ALBUTEROL SULFATE 90 UG/1
2 AEROSOL, METERED RESPIRATORY (INHALATION) EVERY 6 HOURS PRN
Qty: 18 G | Refills: 2 | Status: SHIPPED
Start: 2022-03-29 | End: 2022-06-17 | Stop reason: SDUPTHER

## 2022-03-29 RX ORDER — BICTEGRAVIR SODIUM, EMTRICITABINE, AND TENOFOVIR ALAFENAMIDE FUMARATE 50; 200; 25 MG/1; MG/1; MG/1
1 TABLET ORAL NIGHTLY
Qty: 30 TABLET | Refills: 2 | Status: SHIPPED
Start: 2022-03-29 | End: 2022-06-17 | Stop reason: SDUPTHER

## 2022-03-29 RX ORDER — CLOBETASOL PROPIONATE 0.5 MG/G
CREAM TOPICAL 2 TIMES DAILY PRN
Qty: 30 G | Refills: 2 | Status: SHIPPED
Start: 2022-03-29 | End: 2022-06-17 | Stop reason: SDUPTHER

## 2022-03-29 RX ORDER — ALBUTEROL SULFATE 2.5 MG/3ML
2.5 SOLUTION RESPIRATORY (INHALATION)
Qty: 120 EACH | Refills: 0 | Status: SHIPPED
Start: 2022-03-29 | End: 2022-06-17 | Stop reason: SDUPTHER

## 2022-03-29 RX ORDER — PETROLATUM 42 G/100G
OINTMENT TOPICAL
Qty: 228 G | Refills: 0 | Status: SHIPPED
Start: 2022-03-29 | End: 2022-06-17 | Stop reason: SDUPTHER

## 2022-03-29 RX ORDER — BUDESONIDE AND FORMOTEROL FUMARATE DIHYDRATE 160; 4.5 UG/1; UG/1
2 AEROSOL RESPIRATORY (INHALATION) 2 TIMES DAILY
Qty: 30.6 G | Refills: 1 | Status: SHIPPED
Start: 2022-03-29 | End: 2022-06-17 | Stop reason: SDUPTHER

## 2022-03-29 RX ORDER — PANTOPRAZOLE SODIUM 40 MG/1
40 TABLET, DELAYED RELEASE ORAL
Qty: 90 TABLET | Refills: 1 | Status: SHIPPED
Start: 2022-03-29 | End: 2022-06-17 | Stop reason: SDUPTHER

## 2022-03-29 RX ORDER — NICOTINE 21 MG/24HR
1 PATCH, TRANSDERMAL 24 HOURS TRANSDERMAL DAILY
Qty: 30 PATCH | Refills: 3 | Status: SHIPPED
Start: 2022-03-29 | End: 2022-06-17 | Stop reason: SDUPTHER

## 2022-03-29 RX ORDER — NYSTATIN 100000 U/G
CREAM TOPICAL
Qty: 30 G | Refills: 0 | Status: SHIPPED
Start: 2022-03-29 | End: 2022-03-30

## 2022-03-29 NOTE — PROGRESS NOTES
Attending Physician Statement    S:   Chief Complaint   Patient presents with    Knee Pain     right needs  referral dr Walker Pae a pop in his medial knee after dancing and drinking. Xray negative. Was given a brace. Has been on biktarvy . Due for labs   O: Blood pressure 134/85, pulse 90, temperature 97.3 °F (36.3 °C), temperature source Temporal, resp. rate 18, height 5' 7\" (1.702 m), weight 220 lb (99.8 kg), SpO2 94 %. Exam:   Heart - RRR   Lungs - clear   msk - point tenderness medial joint line on right knee. No knee laxity. Pain with valgus and varus stress testing. A: HIV, knee pain   P:  CD4 count, HIV viral load, CBC, CMP   Referral to sports med for knee injury    Follow-up as ordered    Attending Attestation   I have discussed the case, including pertinent history and exam findings with the resident. I agree with the documented assessment and plan.

## 2022-03-29 NOTE — PROGRESS NOTES
[facility]  FAMILY MEDICINE RESIDENCY PROGRAM  DATE OF VISIT : 3/29/2022    Patient : Bennett Farooq   Age : 39 y.o.  : 1980   MRN : [id number]   ______________________________________________________________________    Chief Complaint :   Chief Complaint   Patient presents with    Knee Pain     right needs  referral dr Louisa Ha       HPI : Bennett Farooq is 39 y.o. male who presented to the clinic today for above chief complaint. Asthma:   Current medications include Symbicort and albuterol as needed. Feels tightness and uses albuterol about twice a day. Does continue to smoke.  Not under the care of pulmonology. PFTs previously ordered but not done.     HIV:   Well-controlled on Biktarvy. Viral load drawn and pending in the lab. Last rx ran out, but had old bottle that he took few doses from.  Last CD4 count checked in hospitalization was above 1000.  No fevers, chills, night sweats. No recent new sexual partners since last visit.      GERD:  Patient currently on PPI. Is under the care of GI. Scopes were planned for  but were not done. Patient also on MiraLAX. Continues to endorse stomach pain and burning, epigastric area. Bowels are moving regularly, no blood in his stool. No nausea or vomiting.     Right knee pain:  Felt pop while dancing over the weekend. . XR negative in the ER. Hurts with straightening of the knee. Has been taking advil for the pain. Has knee brace on that was given in ER.  Referred to ortho but has not called office     Past Medical History :  Past Medical History:   Diagnosis Date    Asthma     GERD (gastroesophageal reflux disease)     HIV (human immunodeficiency virus infection) ([facility] [address]     Psoriasis      Past Surgical History:   Procedure Laterality Date    OTHER SURGICAL HISTORY      patient states he went to surgery to have a boil removed          Review of Systems :    ROS - Per HPI ______________________________________________________________________    Physical Exam :    Wt Readings from Last 3 Encounters:   03/29/22 220 lb (99.8 kg)   03/26/22 225 lb (102.1 kg)   12/22/21 216 lb (98 kg)       BMI Readings from Last 3 Encounters:   03/29/22 34.46 kg/m²   03/26/22 35.24 kg/m²   12/22/21 33.83 kg/m²   ]      Vitals: /85 (Site: Right Upper Arm, Position: Sitting, Cuff Size: Medium Adult)   Pulse 90   Temp 97.3 °F (36.3 °C) (Temporal)   Resp 18   Ht 5' 7\" (1.702 m)   Wt 220 lb (99.8 kg)   SpO2 94%   BMI 34.46 kg/m²     Physical Exam  Constitutional:       General: He is not in acute distress. Appearance: He is well-developed. HENT:      Head: Normocephalic and atraumatic. Eyes:      Conjunctiva/sclera: Conjunctivae normal.      Pupils: Pupils are equal, round, and reactive to light. Neck:      Thyroid: No thyromegaly. Trachea: No tracheal deviation. Cardiovascular:      Rate and Rhythm: Normal rate and regular rhythm. Heart sounds: Normal heart sounds. No murmur heard. Pulmonary:      Effort: Pulmonary effort is normal. No respiratory distress. Breath sounds: No wheezing or rales. Comments: Good air movement  Abdominal:      General: Bowel sounds are normal. There is no distension. Palpations: Abdomen is soft. Tenderness: Tenderness: mild over epigastrium, RUQ. There is no guarding. Musculoskeletal:      Cervical back: Normal range of motion and neck supple. Comments: Right knee in brace. Limited range of motion secondary to pain. Point tenderness along medial joint line. No laxity with anterior posterior drawer test.  Pain though no laxity with varus and valgus stress   Lymphadenopathy:      Cervical: No cervical adenopathy. Neurological:      General: No focal deficit present. Mental Status: He is alert and oriented to person, place, and time. Mental status is at baseline.    Psychiatric:         Mood and Affect: Mood normal.         Behavior: Behavior normal.         ______________________________________________________________________    Assessment & Plan :     Diagnosis Orders   1. Acute pain of right knee  Lucas Cutler MD, Sports Medicine, [facility]   2. Asymptomatic HIV infection, with no history of HIV-related illness (HCC)  bictegravir-emtricitab-tenofovir alafenamide (BIKTARVY) -25 MG TABS per tablet    CBC    Comprehensive Metabolic Panel    HIV RNA, Quantitative, PCR    T + B Lymphocyte Differential   3. Moderate persistent asthma without complication  budesonide-formoterol (SYMBICORT) 160-4.5 MCG/ACT AERO   4. Tinea cruris  mineral oil-hydrophilic petrolatum (HYDROPHOR) ointment    DISCONTINUED: nystatin (MYCOSTATIN) 093020 UNIT/GM cream   5. Gastroesophageal reflux disease, unspecified whether esophagitis present  pantoprazole (PROTONIX) 40 MG tablet     Acute knee pain: After feeling a pop, differentials including meniscal injury versus ligamentous injury versus acute sprain. Will refer to sports medicine, continue rice therapy as previously recommended  HIV: Well-controlled, due for labs, refill Biktarvy  Asthma: Refill inhalers, advised to get PFTs done  Tobacco use: Continue with cessation efforts, using patches  GERD: Follow-up with GI as previously recommended  Health maintenance: Patient to check with insurance company regarding getting Gardasil series completed    Follow up:  Return in about 3 months (around 6/29/2022) for follow up of chronic conditions.        Chaim Wright MD PGY-3    Discussed with: Dr. Collin Nash

## 2022-03-29 NOTE — PATIENT INSTRUCTIONS
[facility] [address], [place]   [facility]  [address] [address]   [phone]

## 2022-03-31 LAB
ABSOLUTE CD 3: 2905 CELLS/UL (ref 570–2400)
ABSOLUTE CD 4 HELPER: 1385 CELLS/UL (ref 430–1800)
ABSOLUTE CD 8 (SUPP): 1524 CELLS/UL (ref 210–1200)
CD4/CD8 RATIO: 0.91 RATIO (ref 0.8–3.9)
LYMPH SUBSET INFORMATION: ABNORMAL

## 2022-04-01 LAB
HIV QUANT LOG: <1.3 LOG CPY/ML
HIV-1 RNA BY PCR, QN: <20 CPY/ML
HIV-1 RNA BY PCR, QN: DETECTED
SOURCE: ABNORMAL

## 2022-04-06 ENCOUNTER — OFFICE VISIT (OUTPATIENT)
Dept: SPORTS MEDICINE | Age: 42
End: 2022-04-06
Payer: COMMERCIAL

## 2022-04-06 VITALS — HEIGHT: 67 IN | BODY MASS INDEX: 34.53 KG/M2 | WEIGHT: 220 LBS

## 2022-04-06 DIAGNOSIS — M25.561 ACUTE PAIN OF RIGHT KNEE: Primary | ICD-10-CM

## 2022-04-06 DIAGNOSIS — S83.241A TEAR OF MEDIAL MENISCUS OF RIGHT KNEE, CURRENT, UNSPECIFIED TEAR TYPE, INITIAL ENCOUNTER: ICD-10-CM

## 2022-04-06 PROCEDURE — G8427 DOCREV CUR MEDS BY ELIG CLIN: HCPCS | Performed by: FAMILY MEDICINE

## 2022-04-06 PROCEDURE — G8417 CALC BMI ABV UP PARAM F/U: HCPCS | Performed by: FAMILY MEDICINE

## 2022-04-06 PROCEDURE — 4004F PT TOBACCO SCREEN RCVD TLK: CPT | Performed by: FAMILY MEDICINE

## 2022-04-06 PROCEDURE — 20610 DRAIN/INJ JOINT/BURSA W/O US: CPT | Performed by: FAMILY MEDICINE

## 2022-04-06 PROCEDURE — 99213 OFFICE O/P EST LOW 20 MIN: CPT | Performed by: FAMILY MEDICINE

## 2022-04-06 NOTE — PROGRESS NOTES
[facility]  PRIMARY CARE SPORTS MEDICINE  DATE OF VISIT : 2022    Patient : Rajinder Subramanian  Age : 39 y.o.  : 1980  MRN : [id number]   ______________________________________________________________________    Chief Complaint :   Chief Complaint   Patient presents with    Knee Pain     (R) knee pain. Patient was dancing with his children and woke up the next day in pain. DOI 3/24. HPI : Rajinder Subramanian is 39 y.o. male who presented to the clinic today for evaluation of right knee pain. Onset of the symptoms was a few weeks ago, with no known mechanism of injury but reports was started after a night of dancing. Current symptoms include giving out, pain and intermittent swelling. Patient denies mechanical symptoms. Pain is aggravated by any weight bearing  activity. Evaluation to date: XRs of the right knee demonstrate no acute fracture or dislocation. Treatment to date: avoidance of offending activity and OTC analgesics which are not very effective.     Past Medical History :  Past Medical History:   Diagnosis Date    Asthma     GERD (gastroesophageal reflux disease)     [facility] ([facility] immunodeficiency virus infection) ([facility] [address]     Psoriasis      Past Surgical History:   Procedure Laterality Date    OTHER SURGICAL HISTORY      patient states he went to surgery to have a boil removed        Allergies :   No Known Allergies    Medication List :    Current Outpatient Medications   Medication Sig Dispense Refill    albuterol sulfate HFA (VENTOLIN HFA) 108 (90 Base) MCG/ACT inhaler Inhale 2 puffs into the lungs every 6 hours as needed for Wheezing 18 g 2    albuterol (PROVENTIL) (2.5 MG/3ML) 0.083% nebulizer solution Take 3 mLs by nebulization every 4-6 hours as needed for Wheezing 120 each 0    bictegravir-emtricitab-tenofovir alafenamide (BIKTARVY) -25 MG TABS per tablet Take 1 tablet by mouth nightly 30 tablet 2    budesonide-formoterol (SYMBICORT) 160-4.5 MCG/ACT AERO Inhale 2 puffs into the lungs 2 times daily 30.6 g 1    clobetasol (TEMOVATE) 0.05 % cream Apply topically 2 times daily as needed (RASH) 30 g 2    mineral oil-hydrophilic petrolatum (HYDROPHOR) ointment Apply topically as needed. 228 g 0    pantoprazole (PROTONIX) 40 MG tablet Take 1 tablet by mouth every morning (before breakfast) 90 tablet 1    nicotine (NICODERM CQ) 21 MG/24HR Place 1 patch onto the skin daily 30 patch 3    naproxen (NAPROSYN) 500 MG tablet Take 1 tablet by mouth 2 times daily as needed for Pain (take with food and full glass of water.) (Patient not taking: Reported on 3/29/2022) 14 tablet 0     No current facility-administered medications for this visit.      ______________________________________________________________________    Physical Exam :    Vitals: Ht 5' 7\" (1.702 m)   Wt 220 lb (99.8 kg) Comment: per pt. BMI 34.46 kg/m²   General Appearance: Nontoxic, awake, alert, and in no acute distress. Chest wall/Lung: Respirations regular and unlabored. No cyanosis. Heart: RRR, distal pulses intact. Neurologic: Alert&Oriented x3. Sensation grossly intact. No focal motor deficits detected. Musculokeletal: RIGHT Knee:  ROM: flexion to 140, extension to 0. No effusion. No obvious bony abnormality or ecchymosis. TTP: ( + ) MJL, ( - ) LJL, ( - ) patellar tendon, ( - ) quadriceps tendon, ( - ) patellar facets  Special Tests: ( - ) Patellar apprehension, ( - ) patellar grind  Stable and without pain to varus and valgus stress at 0 and 30 degrees of knee flexion. ACL: ( - ) Lachman's, ( - ) anterior drawer  PCL: ( - ) posterior drawer, ( - ) sag sign  Meniscus: ( + ) Breezy's  ______________________________________________________________________    Assessment & Plan :    1. Acute pain of right knee  2. Tear of medial meniscus of right knee, current, unspecified tear type, initial encounter  Patient presents to the office today for evaluation of knee pain.   History, physical exam and imaging are consistent with medial meniscal injury. Treatment options discussed with patient in the office today including activity modification, oral anti-inflammatories, physical therapy, injection options, advancing in the form of a MRI and referral to an orthopedic surgeon for discussion of surgical opinion. Patient wishes to proceed with conservative treatment in the form of an intra-articular corticosteroid injection which was performed in the office today, please see procedure note below for further details. Patient will follow up in 6 weeks for reevaluation symptoms and consider escalation therapy should symptoms persist.  Patient is agreeable with above plan all questions and concerns were addressed in the office today. PROCEDURE NOTE: RIGHT knee intra-articular corticosteroid injection  The procedure and its risks, benefits and alternatives were discussed with the patient, and informed consent was obtained. The area was prepped and cleaned with Alcohol. Ethyl Chloride was used for local anesthesia. A 25G 1.5\" needle was inserted into the joint and 3.0 mL of 1% Lidocaine without Epinephrine mixed with 1mL of Kenalog 40mg/1mL was injected into the joint without difficulty. A band aid was placed over the injection site. There was no blood loss. The patient tolerated the procedure well. Discussed signs and symptoms of infection and advised to call right away if this happens. Patient verbalizes understanding and agrees with above assessment, plan, procedure and counseling. Return to Office: Return in about 6 weeks (around 5/18/2022) for injection follow up.     Salome Abraham MD

## 2022-04-12 ENCOUNTER — HOSPITAL ENCOUNTER (OUTPATIENT)
Age: 42
Discharge: HOME | End: 2022-04-12
Payer: COMMERCIAL

## 2022-04-12 PROCEDURE — 86481 TB AG RESPONSE T-CELL SUSP: CPT

## 2022-04-12 PROCEDURE — 36415 COLL VENOUS BLD VENIPUNCTURE: CPT

## 2022-05-03 ENCOUNTER — HOSPITAL ENCOUNTER (EMERGENCY)
Age: 42
Discharge: HOME | End: 2022-05-03
Payer: COMMERCIAL

## 2022-05-03 ENCOUNTER — APPOINTMENT (OUTPATIENT)
Dept: GENERAL RADIOLOGY | Age: 42
End: 2022-05-03
Payer: COMMERCIAL

## 2022-05-03 VITALS
HEART RATE: 84 BPM | DIASTOLIC BLOOD PRESSURE: 80 MMHG | WEIGHT: 215 LBS | BODY MASS INDEX: 33.74 KG/M2 | HEIGHT: 67 IN | RESPIRATION RATE: 16 BRPM | TEMPERATURE: 98.1 F | OXYGEN SATURATION: 99 % | SYSTOLIC BLOOD PRESSURE: 122 MMHG

## 2022-05-03 DIAGNOSIS — U07.1 COVID-19: Primary | ICD-10-CM

## 2022-05-03 LAB
ALBUMIN SERPL-MCNC: 4.2 G/DL (ref 3.5–5.2)
ALP BLD-CCNC: 66 U/L (ref 40–129)
ALT SERPL-CCNC: 29 U/L (ref 0–40)
ANION GAP SERPL CALCULATED.3IONS-SCNC: 8 MMOL/L (ref 7–16)
AST SERPL-CCNC: 35 U/L (ref 0–39)
BASOPHILS ABSOLUTE: 0.01 E9/L (ref 0–0.2)
BASOPHILS RELATIVE PERCENT: 0.2 % (ref 0–2)
BILIRUB SERPL-MCNC: 0.3 MG/DL (ref 0–1.2)
BUN BLDV-MCNC: 14 MG/DL (ref 6–20)
CALCIUM SERPL-MCNC: 9.2 MG/DL (ref 8.6–10.2)
CHLORIDE BLD-SCNC: 104 MMOL/L (ref 98–107)
CO2: 26 MMOL/L (ref 22–29)
CREAT SERPL-MCNC: 1.2 MG/DL (ref 0.7–1.2)
D DIMER: <200 NG/ML DDU
EOSINOPHILS ABSOLUTE: 0.04 E9/L (ref 0.05–0.5)
EOSINOPHILS RELATIVE PERCENT: 0.6 % (ref 0–6)
GFR AFRICAN AMERICAN: >60
GFR NON-AFRICAN AMERICAN: >60 ML/MIN/1.73
GLUCOSE BLD-MCNC: 96 MG/DL (ref 74–99)
HCT VFR BLD CALC: 41.3 % (ref 37–54)
HEMOGLOBIN: 13.5 G/DL (ref 12.5–16.5)
IMMATURE GRANULOCYTES #: 0.02 E9/L
IMMATURE GRANULOCYTES %: 0.3 % (ref 0–5)
LYMPHOCYTES ABSOLUTE: 2.35 E9/L (ref 1.5–4)
LYMPHOCYTES RELATIVE PERCENT: 36.2 % (ref 20–42)
MCH RBC QN AUTO: 30.5 PG (ref 26–35)
MCHC RBC AUTO-ENTMCNC: 32.7 % (ref 32–34.5)
MCV RBC AUTO: 93.2 FL (ref 80–99.9)
MONOCYTES ABSOLUTE: 0.65 E9/L (ref 0.1–0.95)
MONOCYTES RELATIVE PERCENT: 10 % (ref 2–12)
NEUTROPHILS ABSOLUTE: 3.43 E9/L (ref 1.8–7.3)
NEUTROPHILS RELATIVE PERCENT: 52.7 % (ref 43–80)
PDW BLD-RTO: 15.6 FL (ref 11.5–15)
PLATELET # BLD: 359 E9/L (ref 130–450)
PMV BLD AUTO: 8.6 FL (ref 7–12)
POTASSIUM SERPL-SCNC: 4 MMOL/L (ref 3.5–5)
RBC # BLD: 4.43 E12/L (ref 3.8–5.8)
SODIUM BLD-SCNC: 138 MMOL/L (ref 132–146)
TOTAL PROTEIN: 7.5 G/DL (ref 6.4–8.3)
TROPONIN, HIGH SENSITIVITY: 7 NG/L (ref 0–11)
WBC # BLD: 6.5 E9/L (ref 4.5–11.5)

## 2022-05-03 PROCEDURE — 96374 THER/PROPH/DIAG INJ IV PUSH: CPT

## 2022-05-03 PROCEDURE — 80053 COMPREHEN METABOLIC PANEL: CPT

## 2022-05-03 PROCEDURE — 71046 X-RAY EXAM CHEST 2 VIEWS: CPT

## 2022-05-03 PROCEDURE — 93005 ELECTROCARDIOGRAM TRACING: CPT | Performed by: PHYSICIAN ASSISTANT

## 2022-05-03 PROCEDURE — 6370000000 HC RX 637 (ALT 250 FOR IP): Performed by: PHYSICIAN ASSISTANT

## 2022-05-03 PROCEDURE — 6360000002 HC RX W HCPCS: Performed by: PHYSICIAN ASSISTANT

## 2022-05-03 PROCEDURE — 99285 EMERGENCY DEPT VISIT HI MDM: CPT

## 2022-05-03 PROCEDURE — 85025 COMPLETE CBC W/AUTO DIFF WBC: CPT

## 2022-05-03 PROCEDURE — 84484 ASSAY OF TROPONIN QUANT: CPT

## 2022-05-03 PROCEDURE — 85378 FIBRIN DEGRADE SEMIQUANT: CPT

## 2022-05-03 RX ORDER — KETOROLAC TROMETHAMINE 30 MG/ML
15 INJECTION, SOLUTION INTRAMUSCULAR; INTRAVENOUS ONCE
Status: COMPLETED | OUTPATIENT
Start: 2022-05-03 | End: 2022-05-03

## 2022-05-03 RX ORDER — PREDNISONE 20 MG/1
40 TABLET ORAL DAILY
Qty: 10 TABLET | Refills: 0 | Status: SHIPPED | OUTPATIENT
Start: 2022-05-03 | End: 2022-05-08

## 2022-05-03 RX ORDER — PREDNISONE 20 MG/1
60 TABLET ORAL ONCE
Status: COMPLETED | OUTPATIENT
Start: 2022-05-03 | End: 2022-05-03

## 2022-05-03 RX ORDER — BENZONATATE 100 MG/1
100 CAPSULE ORAL 3 TIMES DAILY PRN
Qty: 21 CAPSULE | Refills: 0 | Status: SHIPPED | OUTPATIENT
Start: 2022-05-03 | End: 2022-05-10

## 2022-05-03 RX ADMIN — KETOROLAC TROMETHAMINE 15 MG: 30 INJECTION, SOLUTION INTRAMUSCULAR at 15:36

## 2022-05-03 RX ADMIN — PREDNISONE 60 MG: 20 TABLET ORAL at 16:30

## 2022-05-03 ASSESSMENT — PAIN SCALES - GENERAL
PAINLEVEL_OUTOF10: 8
PAINLEVEL_OUTOF10: 8

## 2022-05-03 ASSESSMENT — PAIN DESCRIPTION - ONSET: ONSET: GRADUAL

## 2022-05-03 ASSESSMENT — PAIN DESCRIPTION - PAIN TYPE: TYPE: ACUTE PAIN

## 2022-05-03 ASSESSMENT — PAIN DESCRIPTION - LOCATION: LOCATION: GENERALIZED

## 2022-05-03 ASSESSMENT — PAIN DESCRIPTION - DESCRIPTORS: DESCRIPTORS: ACHING;DISCOMFORT

## 2022-05-03 ASSESSMENT — PAIN DESCRIPTION - FREQUENCY: FREQUENCY: CONTINUOUS

## 2022-05-03 NOTE — Clinical Note
Radha Zambrano was seen and treated in our emergency department on 5/3/2022.     Please excuse Ariadna Mas for until 5/8/22 or fever free for 24 hours    Rossi Young PA-C

## 2022-05-03 NOTE — ED PROVIDER NOTES
[facility]  Department of Emergency Medicine   ED  Encounter Note  Admit Date/RoomTime: 5/3/2022  1:10 PM  ED Room:     NAME: Bennett Farooq  : 1980  MRN: [id number]     Chief Complaint:  Positive For Covid-19 (+ today at drs office), Shortness of Breath, and Generalized Body Aches    HISTORY OF PRESENT ILLNESS        Bennett Farooq is a 39 y.o. male who presents to the ED by private vehicle for productive cough, body aches, intermittent shortness of breath and chest tightness since last night. Patient states he went to urgent care today and tested positive for COVID-19. Patient states urgent care sent him here due to his symptoms and his history of asthma and HIV. Patient states his symptoms are mild in severity. Patient denies anything make it worse. Patient states his symptoms improved with his steroid inhaler. Patient denies history of blood clots. Denies fever/chills, headache, vision change, dizziness, hemoptysis, sore throat, loss taste or smell, abdominal pain, NVD, numbness/weakness. ROS   Pertinent positives and negatives are stated within HPI, all other systems reviewed and are negative. Past Medical History:  has a past medical history of Asthma, GERD (gastroesophageal reflux disease), HIV (human immunodeficiency virus infection) ([facility] [address] and Psoriasis. Surgical History:  has a past surgical history that includes other surgical history (). Social History:  reports that he has been smoking cigarettes. He has been smoking about 1.00 pack per day. He has never used smokeless tobacco. He reports previous alcohol use. He reports current drug use. Drugs: Methamphetamines (Crystal Meth), Other-see comments, and Marijuana (Keaton Buerger). Family History: family history includes Arthritis in his brother and mother; Other in his mother; Thyroid Disease in his mother. Allergies: Patient has no known allergies.     PHYSICAL EXAM   Oxygen Saturation Interpretation: Normal on room air analysis. ED Triage Vitals   BP Temp Temp Source Pulse Resp SpO2 Height Weight   05/03/22 1250 05/03/22 1250 05/03/22 1634 05/03/22 1250 05/03/22 1250 05/03/22 1250 05/03/22 1250 05/03/22 1250   119/82 98.3 °F (36.8 °C) Oral 86 18 97 % 5' 7\" (1.702 m) 215 lb (97.5 kg)         Physical Exam  Constitutional/General: Alert and oriented x3, well appearing, non toxic. HEENT:  NC/NT. PERRLA,  Airway patent. Neck: Supple, full ROM, non tender to palpation in the midline, no stridor, no crepitus, no meningeal signs  Respiratory: Lungs clear to auscultation bilaterally, no wheezes, rales, or rhonchi. Not in respiratory distress  CV:  Regular rate. Regular rhythm. No murmurs, gallops, or rubs. 2+ distal pulses  Chest: No chest wall tenderness  GI:  Abdomen Soft, Non tender, Non distended. +BS. No rebound, guarding, or rigidity. No pulsatile masses. Musculoskeletal: Moves all extremities x 4. Warm and well perfused, no clubbing, cyanosis, or edema. Capillary refill <3 seconds  Integument: skin warm and dry. No rashes.    Lymphatic: no lymphadenopathy noted  Neurologic: GCS 15, no focal deficits, symmetric strength 5/5 in the upper and lower extremities bilaterally  Psychiatric: Normal Affect      Lab / Imaging Results   (All laboratory and radiology results have been personally reviewed by myself)  Labs:  Results for orders placed or performed during the hospital encounter of 05/03/22   CBC with Auto Differential   Result Value Ref Range    WBC 6.5 4.5 - 11.5 E9/L    RBC 4.43 3.80 - 5.80 E12/L    Hemoglobin 13.5 12.5 - 16.5 g/dL    Hematocrit 41.3 37.0 - 54.0 %    MCV 93.2 80.0 - 99.9 fL    MCH 30.5 26.0 - 35.0 pg    MCHC 32.7 32.0 - 34.5 %    RDW 15.6 (H) 11.5 - 15.0 fL    Platelets 140 125 - 024 E9/L    MPV 8.6 7.0 - 12.0 fL    Neutrophils % 52.7 43.0 - 80.0 %    Immature Granulocytes % 0.3 0.0 - 5.0 %    Lymphocytes % 36.2 20.0 - 42.0 %    Monocytes % 10.0 2.0 - 12.0 % Eosinophils % 0.6 0.0 - 6.0 %    Basophils % 0.2 0.0 - 2.0 %    Neutrophils Absolute 3.43 1.80 - 7.30 E9/L    Immature Granulocytes # 0.02 E9/L    Lymphocytes Absolute 2.35 1.50 - 4.00 E9/L    Monocytes Absolute 0.65 0.10 - 0.95 E9/L    Eosinophils Absolute 0.04 (L) 0.05 - 0.50 E9/L    Basophils Absolute 0.01 0.00 - 0.20 E9/L   Comprehensive Metabolic Panel   Result Value Ref Range    Sodium 138 132 - 146 mmol/L    Potassium 4.0 3.5 - 5.0 mmol/L    Chloride 104 98 - 107 mmol/L    CO2 26 22 - 29 mmol/L    Anion Gap 8 7 - 16 mmol/L    Glucose 96 74 - 99 mg/dL    BUN 14 6 - 20 mg/dL    CREATININE 1.2 0.7 - 1.2 mg/dL    GFR Non-African American >60 >=60 mL/min/1.73    GFR African American >60     Calcium 9.2 8.6 - 10.2 mg/dL    Total Protein 7.5 6.4 - 8.3 g/dL    Albumin 4.2 3.5 - 5.2 g/dL    Total Bilirubin 0.3 0.0 - 1.2 mg/dL    Alkaline Phosphatase 66 40 - 129 U/L    ALT 29 0 - 40 U/L    AST 35 0 - 39 U/L   Troponin   Result Value Ref Range    Troponin, High Sensitivity 7 0 - 11 ng/L   D-Dimer, Quantitative   Result Value Ref Range    D-Dimer, Quant <200 ng/mL DDU   EKG 12 Lead   Result Value Ref Range    Ventricular Rate 71 BPM    Atrial Rate 71 BPM    P-R Interval 162 ms    QRS Duration 80 ms    Q-T Interval 360 ms    QTc Calculation (Bazett) 391 ms    P Axis 57 degrees    R Axis 66 degrees    T Axis 56 degrees     Imaging: All Radiology results interpreted by Radiologist unless otherwise noted. XR CHEST (2 VW)   Final Result   No active cardiopulmonary disease. EKG #1:  Interpreted by emergency department attending physician unless otherwise noted.     5/3/22  Time: 1544    Rhythm: normal sinus  Rate: 71  Clinical Impression: no st segment elevation or depression, no acute changes compared to previous      ED Course / Medical Decision Making     Medications   ketorolac (TORADOL) injection 15 mg (15 mg IntraVENous Given 5/3/22 1536)   predniSONE (DELTASONE) tablet 60 mg (60 mg Oral Given 5/3/22 8065) Consultations:             None    Procedures:   none    MDM: Patient presenting with COVID-19. Patient is in no acute distress, afebrile, nontoxic appearance. Patient's labs, EKG, imaging are stable. Patient feeling better after medications given here. Patient will be started on prednisone and Tessalon Perles. Patient given a pulse ox to use as needed. Patient has an inhaler at home to use. Patient follow-up with PCP. Discussed quarantine with patient. Recommend patient return to the ED with new or worsening of symptoms. Plan of Care/Counseling:  Fabiola Rhodes PA-C reviewed today's visit with the patient in addition to providing specific details for the plan of care and counseling regarding the diagnosis and prognosis. Questions are answered at this time and are agreeable with the plan. ASSESSMENT     1. COVID-19      This patient's ED course included: a personal history and physicial examination and multiple bedside re-evaluations  This patient has remained hemodynamically stable during their ED course. PLAN   Discharged home. Patient condition is stable. New Medications     New Prescriptions    BENZONATATE (TESSALON PERLES) 100 MG CAPSULE    Take 1 capsule by mouth 3 times daily as needed for Cough    PREDNISONE (DELTASONE) 20 MG TABLET    Take 2 tablets by mouth daily for 5 days     Electronically signed by Fabiola Rhodes PA-C   DD: 5/3/22  **This report was transcribed using voice recognition software. Every effort was made to ensure accuracy; however, inadvertent computerized transcription errors may be present.   END OF PROVIDER NOTE     Fabiola Rhodes PA-C  05/03/22 9954

## 2022-05-03 NOTE — ED NOTES
Department of Emergency Medicine  FIRST PROVIDER TRIAGE NOTE             Independent MLP           5/3/22  12:52 PM EDT    Date of Encounter: 5/3/22   MRN: [id number]      HPI: Darlene Carrion is a 39 y.o. male who presents to the ED for Positive For Covid-19 (+ today at drs office), Shortness of Breath, and Generalized Body Aches     Tested at [facility]. PE: Gen Appearance/Constitutional: alert     Initial Plan of Care: All treatment areas with department are currently occupied. Plan to order/Initiate the following while awaiting opening in ED: imaging studies.   Initiate Treatment-Testing, Proceed toTreatment Area When Bed Available for ED Attending/MLP to Continue Care    Electronically signed by Vivien Jose PA-C   DD: 5/3/22         Vivien Jose PA-C  05/03/22 1110

## 2022-05-03 NOTE — ED NOTES
Discharge instructions given, medications and follow up instructions reviewed. Patient verbalized understanding, no other noted or stated problems at this time. Patient will follow up with physicians as directed.         Balbina Pate RN  05/03/22 3733

## 2022-05-04 ENCOUNTER — CARE COORDINATION (OUTPATIENT)
Dept: CARE COORDINATION | Age: 42
End: 2022-05-04

## 2022-05-04 LAB
EKG ATRIAL RATE: 71 BPM
EKG P AXIS: 57 DEGREES
EKG P-R INTERVAL: 162 MS
EKG Q-T INTERVAL: 360 MS
EKG QRS DURATION: 80 MS
EKG QTC CALCULATION (BAZETT): 391 MS
EKG R AXIS: 66 DEGREES
EKG T AXIS: 56 DEGREES
EKG VENTRICULAR RATE: 71 BPM

## 2022-05-04 NOTE — CARE COORDINATION
Date/Time:  5/4/2022 1:50 PM  Attempted to reach patient by telephone. Left HIPAA compliant message requesting a return call. Will attempt to reach patient again.

## 2022-05-05 ENCOUNTER — CARE COORDINATION (OUTPATIENT)
Dept: CARE COORDINATION | Age: 42
End: 2022-05-05

## 2022-05-05 NOTE — CARE COORDINATION
Date/Time:  5/5/2022 9:44 AM  Attempted to reach patient by telephone. Unable to leave voicemail, mailbox full. No further outreach.

## 2022-05-05 NOTE — CARE COORDINATION
Ambulatory Care Coordination Note  5/5/2022  CM Risk Score: 1  Charlson 10 Year Mortality Risk Score: 4%     ACC: Kade Mir RN    Summary Note:    AC spoke with Arias Breaux to invite him to join care coordination and he is in agreement. He reports he currently has COVID-19. He denies any increased shortness of breath or fever. He reports today SPO2 94%. He denies any increased use of his rescue inhaler. He states he has been using his nebulizer 3 times per day and that 2 times per day is baseline for him. He reports body aches and a headache and he is taking advil for relief. Medications were reviewed and he reports he is taking them as prescribed. He reports he smokes 3-4 cigarettes per day, down from one pack per day. He states he is using the nicoderm patch. He states he has a 's license but does not have a car. He utilizes [employer] or his mother or family members provide transportation for appointments. He states he receives SNAP benefits. He admits to some stress and states expressed interest in therapy. Future appointments were reviewed. PLAN  Complete enrollment with next interaction. Continue to follow for care coorination. Ambulatory Care Coordination Assessment    Care Coordination Protocol  Referral from Primary Care Provider: No  Week 1 - Initial Assessment     Do you have all of your prescriptions and are they filled?: Yes  Barriers to medication adherence: None  Are you able to afford your medications?: Yes  How often do you have trouble taking your medications the way you have been told to take them?: I always take them as prescribed. Do you have Home O2 Therapy?: No      Ability to seek help/take action for Emergent Urgent situations i.e. fire, crime, inclement weather or health crisis. : Independent  Ability to ambulate to restroom: Independent  Ability handle personal hygeine needs (bathing/dressing/grooming):  Independent  Ability to manage Medications: Independent  Ability to prepare Food Preparation: Independent  Ability to maintain home (clean home, laundry): Independent  Ability to drive and/or has transportation: Independent  Ability to do shopping: Independent  Ability to manage finances: Independent  Is patient able to live independently?: Yes           Per the Fall Risk Screening, did the patient have 2 or more falls or 1 fall with injury in the past year?: No           Suggested Interventions and [employer] Health: Not Started     Smoking Cessation: In Process   Zone Management Tools: In Process         Set up/Review an Education Plan, Set up/Review Goals              Prior to Admission medications    Medication Sig Start Date End Date Taking? Authorizing Provider   predniSONE (DELTASONE) 20 MG tablet Take 2 tablets by mouth daily for 5 days 5/3/22 5/8/22 Yes Rossi Young PA-C   benzonatate (TESSALON PERLES) 100 MG capsule Take 1 capsule by mouth 3 times daily as needed for Cough 5/3/22 5/10/22 Yes Rossi Young PA-C   albuterol sulfate HFA (VENTOLIN HFA) 108 (90 Base) MCG/ACT inhaler Inhale 2 puffs into the lungs every 6 hours as needed for Wheezing 3/29/22  Yes Tj Oakes MD   albuterol (PROVENTIL) (2.5 MG/3ML) 0.083% nebulizer solution Take 3 mLs by nebulization every 4-6 hours as needed for Wheezing 3/29/22  Yes Tj Oakes MD   bictegravir-emtricitab-tenofovir alafenamide (BIKTARVY) -25 MG TABS per tablet Take 1 tablet by mouth nightly 3/29/22  Yes Tj Oakes MD   budesonide-formoterol [facility]) 160-4.5 MCG/ACT AERO Inhale 2 puffs into the lungs 2 times daily 3/29/22  Yes Tj Oakes MD   clobetasol (TEMOVATE) 0.05 % cream Apply topically 2 times daily as needed (RASH) 3/29/22  Yes Tj Oakes MD   mineral oil-hydrophilic petrolatum (HYDROPHOR) ointment Apply topically as needed.  3/29/22  Yes Tj Oakes MD   pantoprazole (PROTONIX) 40 MG tablet Take 1 tablet by mouth every morning (before breakfast) 3/29/22  Yes Jorje Branham MD   nicotine (NICODERM CQ) 21 MG/24HR Place 1 patch onto the skin daily 3/29/22 5/5/22 Yes Jorje Branham MD   naproxen (NAPROSYN) 500 MG tablet Take 1 tablet by mouth 2 times daily as needed for Pain (take with food and full glass of water.)  Patient not taking: Reported on 3/29/2022 3/26/22 4/2/22  JUANA Dominguez - CNP       Future Appointments   Date Time Provider Margarita Huff   5/18/2022 11:00 AM Aram Lea MD [facility]   6/17/2022  3:00 PM Jorje Branham MD [facility]      and   General Assessment    Do you have any symptoms that are causing concern?: Yes  Progression since Onset: Unchanged  Reported Symptoms: Rash (Comment: psoriasis)

## 2022-05-09 ENCOUNTER — CARE COORDINATION (OUTPATIENT)
Dept: CARE COORDINATION | Age: 42
End: 2022-05-09

## 2022-05-09 SDOH — ECONOMIC STABILITY: TRANSPORTATION INSECURITY
IN THE PAST 12 MONTHS, HAS LACK OF TRANSPORTATION KEPT YOU FROM MEETINGS, WORK, OR FROM GETTING THINGS NEEDED FOR DAILY LIVING?: NO

## 2022-05-09 SDOH — ECONOMIC STABILITY: TRANSPORTATION INSECURITY
IN THE PAST 12 MONTHS, HAS THE LACK OF TRANSPORTATION KEPT YOU FROM MEDICAL APPOINTMENTS OR FROM GETTING MEDICATIONS?: NO

## 2022-05-09 SDOH — HEALTH STABILITY: PHYSICAL HEALTH: ON AVERAGE, HOW MANY DAYS PER WEEK DO YOU ENGAGE IN MODERATE TO STRENUOUS EXERCISE (LIKE A BRISK WALK)?: 4 DAYS

## 2022-05-09 SDOH — HEALTH STABILITY: PHYSICAL HEALTH: ON AVERAGE, HOW MANY MINUTES DO YOU ENGAGE IN EXERCISE AT THIS LEVEL?: 120 MIN

## 2022-05-09 ASSESSMENT — SOCIAL DETERMINANTS OF HEALTH (SDOH)
HOW OFTEN DO YOU GET TOGETHER WITH FRIENDS OR RELATIVES?: ONCE A WEEK
IN A TYPICAL WEEK, HOW MANY TIMES DO YOU TALK ON THE PHONE WITH FAMILY, FRIENDS, OR NEIGHBORS?: MORE THAN THREE TIMES A WEEK
HOW OFTEN DO YOU ATTEND CHURCH OR RELIGIOUS SERVICES?: NEVER
ARE YOU MARRIED, WIDOWED, DIVORCED, SEPARATED, NEVER MARRIED, OR LIVING WITH A PARTNER?: NEVER MARRIED
DO YOU BELONG TO ANY CLUBS OR ORGANIZATIONS SUCH AS CHURCH GROUPS UNIONS, FRATERNAL OR ATHLETIC GROUPS, OR SCHOOL GROUPS?: NO
HOW OFTEN DO YOU ATTENT MEETINGS OF THE CLUB OR ORGANIZATION YOU BELONG TO?: NEVER

## 2022-05-09 NOTE — CARE COORDINATION
Ambulatory Care Coordination Note  5/9/2022  CM Risk Score: 1  Charlson 10 Year Mortality Risk Score: 4%     ACC: Keena Jonas, RN    Summary Note:   ACM spoke with Erika Diehl for care coordination follow up. Chief complaint today is pain in his legs. He states it is a stabbing pain rated 7/10 and that laying down does provide some relief. He denies any swelling in his legs, discoloration or being cool to touch. He reports he has had body aches and a headache since his COVID-19 diagnosis. He denies any increased use of his rescue inhaler and admits he has not checked his SPO2 because his \"breathing has been good\". He denies any changes to his medications and states he is taking them as prescribed. Emergency contacts were reviewed and current. ACM discussed advance care planning and Erika Shanita states he does not have any ACP and declined assistance to complete ACP. ACM explained MyChart and he declined. Future appointments were reviewed. PLAN  Notify PCP of leg pain. Request referral to Dr Thom Patterson from PCP. Review medications, appointments and assess care coordination needs with next interaction. Continue to follow for care coordination. Care Coordination Interventions    Referral from Primary Care Provider: No  Suggested Interventions and Community Resources  Behavorial Health: In Process  Smoking Cessation: Completed (Comment: Nicoderm)  Zone Management Tools: In Process (Comment: asthma)         Goals Addressed    None         Prior to Admission medications    Medication Sig Start Date End Date Taking?  Authorizing Provider   benzonatate (TESSALON PERLES) 100 MG capsule Take 1 capsule by mouth 3 times daily as needed for Cough 5/3/22 5/10/22  Rossi Young PA-C   albuterol sulfate HFA (VENTOLIN HFA) 108 (90 Base) MCG/ACT inhaler Inhale 2 puffs into the lungs every 6 hours as needed for Wheezing 3/29/22   Tj Oakes MD   albuterol (PROVENTIL) (2.5 MG/3ML) 0.083% nebulizer solution Take 3 mLs by nebulization every 4-6 hours as needed for Wheezing 3/29/22   Reese Chavez MD   bictegravir-emtricitab-tenofovir alafenamide (BIKTARVY) -25 MG TABS per tablet Take 1 tablet by mouth nightly 3/29/22   Reese Chavez MD   budesonide-formoterol [facility]) 160-4.5 MCG/ACT AERO Inhale 2 puffs into the lungs 2 times daily 3/29/22   Reese Chavez MD   clobetasol (TEMOVATE) 0.05 % cream Apply topically 2 times daily as needed (RASH) 3/29/22   Reese Chavez MD   mineral oil-hydrophilic petrolatum (HYDROPHOR) ointment Apply topically as needed.  3/29/22   Reese Chavez MD   pantoprazole (PROTONIX) 40 MG tablet Take 1 tablet by mouth every morning (before breakfast) 3/29/22   Reese Chavez MD   nicotine (NICODERM CQ) 21 MG/24HR Place 1 patch onto the skin daily 3/29/22 5/5/22  Reese Chavez MD   naproxen (NAPROSYN) 500 MG tablet Take 1 tablet by mouth 2 times daily as needed for Pain (take with food and full glass of water.)  Patient not taking: Reported on 3/29/2022 3/26/22 4/2/22  JUANA Naranjo - CNP       Future Appointments   Date Time Provider Margarita Huff   5/18/2022 11:00 AM Nidia Mccormick MD [facility]   6/17/2022  3:00 PM MD Chriss Schmid [facility]      and   General Assessment    Do you have any symptoms that are causing concern?: Yes  Progression since Onset: Gradually Worsening  Reported Symptoms: Pain (Comment: legs)

## 2022-05-11 ENCOUNTER — TELEPHONE (OUTPATIENT)
Dept: FAMILY MEDICINE CLINIC | Age: 42
End: 2022-05-11

## 2022-05-11 NOTE — TELEPHONE ENCOUNTER
Cam  called in and he has tested positive for COVID on Tuesday 5/3/22. He is still having the body aches, migraine and congestion. He is asking for an excuse for work through 5/15, returning on the 16th.     Please advise

## 2022-05-12 ENCOUNTER — TELEPHONE (OUTPATIENT)
Dept: FAMILY MEDICINE CLINIC | Age: 42
End: 2022-05-12

## 2022-05-12 NOTE — TELEPHONE ENCOUNTER
----- Message from Daniel Herrera sent at 5/11/2022  9:23 AM EDT -----  Subject: Message to Provider    QUESTIONS  Information for Provider? 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  ---------------------------------------------------------------------------  --------------  [address]  What is the best way for the office to contact you? OK to leave message on   voicemail  Preferred Call Back Phone Number? [phone]  ---------------------------------------------------------------------------  --------------  SCRIPT ANSWERS  Relationship to Patient?  Self

## 2022-05-17 ENCOUNTER — CARE COORDINATION (OUTPATIENT)
Dept: CARE COORDINATION | Age: 42
End: 2022-05-17

## 2022-05-25 ENCOUNTER — CARE COORDINATION (OUTPATIENT)
Dept: CARE COORDINATION | Age: 42
End: 2022-05-25

## 2022-05-25 NOTE — CARE COORDINATION
8/5/22              Prior to Admission medications    Medication Sig Start Date End Date Taking? Authorizing Provider   albuterol sulfate HFA (VENTOLIN HFA) 108 (90 Base) MCG/ACT inhaler Inhale 2 puffs into the lungs every 6 hours as needed for Wheezing 3/29/22   Darylene Cloud, MD   albuterol (PROVENTIL) (2.5 MG/3ML) 0.083% nebulizer solution Take 3 mLs by nebulization every 4-6 hours as needed for Wheezing 3/29/22   Darylene Cloud, MD   bictegravir-emtricitab-tenofovir alafenamide (BIKTARVY) -25 MG TABS per tablet Take 1 tablet by mouth nightly 3/29/22   Darylene Cloud, MD   budesonide-formoterol [facility]) 160-4.5 MCG/ACT AERO Inhale 2 puffs into the lungs 2 times daily 3/29/22   Darylene Cloud, MD   clobetasol (TEMOVATE) 0.05 % cream Apply topically 2 times daily as needed (RASH) 3/29/22   Darylene Cloud, MD   mineral oil-hydrophilic petrolatum (HYDROPHOR) ointment Apply topically as needed.  3/29/22   Darylene Cloud, MD   pantoprazole (PROTONIX) 40 MG tablet Take 1 tablet by mouth every morning (before breakfast) 3/29/22   Darylene Cloud, MD   nicotine (NICODERM CQ) 21 MG/24HR Place 1 patch onto the skin daily 3/29/22 5/5/22  Darylene Cloud, MD   naproxen (NAPROSYN) 500 MG tablet Take 1 tablet by mouth 2 times daily as needed for Pain (take with food and full glass of water.)  Patient not taking: Reported on 3/29/2022 3/26/22 4/2/22  JUANA Townsend - CNP       Future Appointments   Date Time Provider Margarita Huff   6/17/2022  3:00 PM Darylene Cloud, MD [facility]      and   General Assessment    Do you have any symptoms that are causing concern?: No

## 2022-06-08 ENCOUNTER — CARE COORDINATION (OUTPATIENT)
Dept: CARE COORDINATION | Age: 42
End: 2022-06-08

## 2022-06-17 ENCOUNTER — OFFICE VISIT (OUTPATIENT)
Dept: FAMILY MEDICINE CLINIC | Age: 42
End: 2022-06-17
Payer: COMMERCIAL

## 2022-06-17 VITALS
HEART RATE: 100 BPM | BODY MASS INDEX: 32.33 KG/M2 | HEIGHT: 67 IN | OXYGEN SATURATION: 92 % | TEMPERATURE: 98 F | DIASTOLIC BLOOD PRESSURE: 71 MMHG | SYSTOLIC BLOOD PRESSURE: 110 MMHG | WEIGHT: 206 LBS

## 2022-06-17 DIAGNOSIS — J45.40 MODERATE PERSISTENT ASTHMA WITHOUT COMPLICATION: ICD-10-CM

## 2022-06-17 DIAGNOSIS — L85.3 DRY SKIN: ICD-10-CM

## 2022-06-17 DIAGNOSIS — K21.9 GASTROESOPHAGEAL REFLUX DISEASE, UNSPECIFIED WHETHER ESOPHAGITIS PRESENT: ICD-10-CM

## 2022-06-17 DIAGNOSIS — Z21 ASYMPTOMATIC HIV INFECTION, WITH NO HISTORY OF HIV-RELATED ILLNESS (HCC): Primary | ICD-10-CM

## 2022-06-17 PROCEDURE — 99212 OFFICE O/P EST SF 10 MIN: CPT | Performed by: FAMILY MEDICINE

## 2022-06-17 PROCEDURE — 90471 IMMUNIZATION ADMIN: CPT

## 2022-06-17 PROCEDURE — 99213 OFFICE O/P EST LOW 20 MIN: CPT | Performed by: FAMILY MEDICINE

## 2022-06-17 PROCEDURE — 90734 MENACWYD/MENACWYCRM VACC IM: CPT

## 2022-06-17 PROCEDURE — G8427 DOCREV CUR MEDS BY ELIG CLIN: HCPCS | Performed by: FAMILY MEDICINE

## 2022-06-17 PROCEDURE — 4004F PT TOBACCO SCREEN RCVD TLK: CPT | Performed by: FAMILY MEDICINE

## 2022-06-17 PROCEDURE — 6360000002 HC RX W HCPCS

## 2022-06-17 PROCEDURE — G8417 CALC BMI ABV UP PARAM F/U: HCPCS | Performed by: FAMILY MEDICINE

## 2022-06-17 RX ORDER — PETROLATUM 42 G/100G
OINTMENT TOPICAL
Qty: 228 G | Refills: 0 | Status: SHIPPED | OUTPATIENT
Start: 2022-06-17

## 2022-06-17 RX ORDER — CLOBETASOL PROPIONATE 0.5 MG/G
CREAM TOPICAL 2 TIMES DAILY PRN
Qty: 30 G | Refills: 2 | Status: SHIPPED | OUTPATIENT
Start: 2022-06-17

## 2022-06-17 RX ORDER — PANTOPRAZOLE SODIUM 40 MG/1
40 TABLET, DELAYED RELEASE ORAL
Qty: 90 TABLET | Refills: 1 | Status: SHIPPED | OUTPATIENT
Start: 2022-06-17

## 2022-06-17 RX ORDER — APREMILAST 30 MG/1
TABLET, FILM COATED ORAL
COMMUNITY
Start: 2022-06-09

## 2022-06-17 RX ORDER — ALBUTEROL SULFATE 90 UG/1
2 AEROSOL, METERED RESPIRATORY (INHALATION) EVERY 6 HOURS PRN
Qty: 18 G | Refills: 2 | Status: SHIPPED | OUTPATIENT
Start: 2022-06-17

## 2022-06-17 RX ORDER — BICTEGRAVIR SODIUM, EMTRICITABINE, AND TENOFOVIR ALAFENAMIDE FUMARATE 50; 200; 25 MG/1; MG/1; MG/1
1 TABLET ORAL NIGHTLY
Qty: 30 TABLET | Refills: 2 | Status: SHIPPED | OUTPATIENT
Start: 2022-06-17

## 2022-06-17 RX ORDER — ALBUTEROL SULFATE 2.5 MG/3ML
2.5 SOLUTION RESPIRATORY (INHALATION)
Qty: 120 EACH | Refills: 0 | Status: SHIPPED | OUTPATIENT
Start: 2022-06-17

## 2022-06-17 RX ORDER — NICOTINE 21 MG/24HR
1 PATCH, TRANSDERMAL 24 HOURS TRANSDERMAL DAILY
Qty: 30 PATCH | Refills: 3 | Status: SHIPPED | OUTPATIENT
Start: 2022-06-17 | End: 2022-07-17

## 2022-06-17 RX ORDER — BUDESONIDE AND FORMOTEROL FUMARATE DIHYDRATE 160; 4.5 UG/1; UG/1
2 AEROSOL RESPIRATORY (INHALATION) 2 TIMES DAILY
Qty: 30.6 G | Refills: 1 | Status: SHIPPED | OUTPATIENT
Start: 2022-06-17

## 2022-06-17 ASSESSMENT — PATIENT HEALTH QUESTIONNAIRE - PHQ9
2. FEELING DOWN, DEPRESSED OR HOPELESS: 0
SUM OF ALL RESPONSES TO PHQ QUESTIONS 1-9: 0
1. LITTLE INTEREST OR PLEASURE IN DOING THINGS: 0
SUM OF ALL RESPONSES TO PHQ9 QUESTIONS 1 & 2: 0
SUM OF ALL RESPONSES TO PHQ QUESTIONS 1-9: 0

## 2022-06-17 NOTE — PROGRESS NOTES
S: 39 y.o. male here for asthma, hiv, gerd. Asthma slightly worse lately 2/2 weather. covid in May, recovered well. HIV controlled on biktarvy  Gerd. Controlled. O: VS: /71 (Site: Left Upper Arm, Position: Sitting, Cuff Size: Large Adult)   Pulse 100   Temp 98 °F (36.7 °C) (Temporal)   Ht 5' 7\" (1.702 m)   Wt 206 lb (93.4 kg)   SpO2 92%   BMI 32.26 kg/m²    General: NAD, alert and interacting appropriately. No thrush. No LAD. CV:  RRR, no gallops, rubs, or murmurs    Resp: CTAB   Abd:  Soft, nontender   Ext:  No edema    Impression: HIV. GERD. asthma  Plan:   Refill meds  menactra  rtc 3 mo for hiv    Attending Physician Statement  I have discussed the case, including pertinent history and exam findings with the resident. I agree with the documented assessment and plan.

## 2022-06-17 NOTE — PROGRESS NOTES
[facility]  FAMILY MEDICINE RESIDENCY PROGRAM  DATE OF VISIT : 3/29/2022    Patient : Winston Reinoso   Age : 39 y.o.  : 1980   MRN : [id number]   ______________________________________________________________________    Chief Complaint :   Chief Complaint   Patient presents with    3 Month Follow-Up       HPI : Winston Reinoso is 39 y.o. male who presented to the clinic today for above chief complaint. Asthma:   Current medications include Symbicort and albuterol as needed. More albuterol with warmer temps. Does continue to smoke.  Not under the care of pulmonology. PFTs previously ordered but not done.     HIV:   Well-controlled on Biktarvy. No missed doses. Last viral load less than 20 copies, last CD4 count above 1000.  No fevers, chills, night sweats. No recent new sexual partners since last visit.      GERD:  Patient currently on PPI. Is under the care of GI. Scopes were planned for  but were not done. Patient also on MiraLAX. Symptoms overall controlled. Bowels are moving regularly, no blood in his stool.   No nausea or vomiting.         Past Medical History :  Past Medical History:   Diagnosis Date    Asthma     GERD (gastroesophageal reflux disease)     HIV (human immunodeficiency virus infection) ([address]     Psoriasis      Past Surgical History:   Procedure Laterality Date    OTHER SURGICAL HISTORY      patient states he went to surgery to have a boil removed          Review of Systems :    ROS - Per HPI       ______________________________________________________________________    Physical Exam :    Wt Readings from Last 3 Encounters:   22 206 lb (93.4 kg)   22 215 lb (97.5 kg)   22 220 lb (99.8 kg)       BMI Readings from Last 3 Encounters:   22 32.26 kg/m²   22 33.67 kg/m²   22 34.46 kg/m²   ]      Vitals: /71 (Site: Left Upper Arm, Position: Sitting, Cuff Size: Large Adult)   Pulse 100   Temp 98 °F (36.7 °C) (Temporal) Ht 5' 7\" (1.702 m)   Wt 206 lb (93.4 kg)   SpO2 92%   BMI 32.26 kg/m²     Physical Exam  Constitutional:       General: He is not in acute distress. Appearance: He is well-developed. HENT:      Head: Normocephalic and atraumatic. Eyes:      Conjunctiva/sclera: Conjunctivae normal.      Pupils: Pupils are equal, round, and reactive to light. Neck:      Thyroid: No thyromegaly. Trachea: No tracheal deviation. Cardiovascular:      Rate and Rhythm: Normal rate and regular rhythm. Heart sounds: Normal heart sounds. No murmur heard. Pulmonary:      Effort: Pulmonary effort is normal. No respiratory distress. Breath sounds: No wheezing or rales. Comments: Good air movement  Abdominal:      General: Bowel sounds are normal. There is no distension. Palpations: Abdomen is soft. Tenderness: There is no guarding. Musculoskeletal:      Cervical back: Normal range of motion and neck supple. Lymphadenopathy:      Cervical: No cervical adenopathy. Neurological:      General: No focal deficit present. Mental Status: He is alert and oriented to person, place, and time. Mental status is at baseline. Psychiatric:         Mood and Affect: Mood normal.         Behavior: Behavior normal.         ______________________________________________________________________    Assessment & Plan :     Diagnosis Orders   1. Asymptomatic HIV infection, with no history of [facility]related illness [facility]  bictegravir-emtricitab-tenofovir alafenamide (BIKTARVY) -25 MG TABS per tablet    Meningococcal, [address], (age 1m-47y), IM   2. Moderate persistent asthma without complication  albuterol sulfate HFA (VENTOLIN HFA) 108 (90 Base) MCG/ACT inhaler    albuterol (PROVENTIL) (2.5 MG/3ML) 0.083% nebulizer solution    budesonide-formoterol (SYMBICORT) 160-4.5 MCG/ACT AERO   3. Dry skin  mineral oil-hydrophilic petrolatum (HYDROPHOR) ointment   4.  Gastroesophageal reflux disease, unspecified whether esophagitis present  pantoprazole (PROTONIX) 40 MG tablet     HIV: Well-controlled, continue Biktarvy, complete meningitis series today, booster dose in 5 years  Asthma: Stable well-controlled, continue present management, refill inhalers  GERD: Continue Protonix, follow-up with GI    Follow up:  Return in about 3 months (around 9/17/2022) for new to provider appt.        Nora Moura MD PGY-3    Discussed with: Dr. West Licea

## 2022-06-23 ENCOUNTER — CARE COORDINATION (OUTPATIENT)
Dept: CARE COORDINATION | Age: 42
End: 2022-06-23

## 2022-06-30 ENCOUNTER — CARE COORDINATION (OUTPATIENT)
Dept: CARE COORDINATION | Age: 42
End: 2022-06-30

## 2022-07-08 ENCOUNTER — CARE COORDINATION (OUTPATIENT)
Dept: CARE COORDINATION | Age: 42
End: 2022-07-08

## 2022-07-08 NOTE — CARE COORDINATION
Ambulatory Care Coordination Note  7/8/2022  CM Risk Score: 1  Charlson 10 Year Mortality Risk Score: 4%     ACC: Declan Mack RN    Summary Note:   ACM spoke with Jaxson Arseniofrancine for care coordination follow up. He denies any increased shortness of breath or increased use of his inhaler. He admits he continues to smoke and declined assistance quitting smoking. He denies any changes to his medications and states he is taking them as prescribed. He admits he has not yet established with a psychologist or counselor for therapy. He declined assistance establishing. Future appointments were reviewed. PLAN  Review medications, appointments and assess care coordination needs with next interaction. Continue to follow for care coordination. Lab Results     None          Care Coordination Interventions    Referral from Primary Care Provider: No  Suggested Interventions and Community Resources  Behavorial Health: In Process  Smoking Cessation: Declined (Comment: Nicoderm)  Zone Management Tools: Completed (Comment: asthma)         Goals Addressed                 This Visit's Progress     Behavioral Health   No change     I will work towards the following Behavioral Health goals: I will schedule a new appointment to establish care with a psychologist/counselor and/or psychiatrist. and I will continue to follow up with my psychologist /counselor and/or psychiatrist.    Barriers: lack of motivation, time constraints, and lack of education  Plan for overcoming my barriers: care coordination, therapy referral  Confidence: 8/10  Anticipated Goal Completion Date: 8/5/22       Conditions and Symptoms   On track     I will schedule office visits, as directed by my provider. I will keep my appointment or reschedule if I have to cancel. I will notify my provider of any barriers to my plan of care. I will follow my Zone Management tool to seek urgent or emergent care.   I will notify my provider of any symptoms that indicate a worsening of my condition. Barriers: lack of motivation, stress, and lack of education  Plan for overcoming my barriers: asthma zone handout, care coordination  Confidence: 9/10  Anticipated Goal Completion Date: 8/5/22              Prior to Admission medications    Medication Sig Start Date End Date Taking? Authorizing Provider   albuterol sulfate HFA (VENTOLIN HFA) 108 (90 Base) MCG/ACT inhaler Inhale 2 puffs into the lungs every 6 hours as needed for Wheezing 6/17/22   Matt Green MD   albuterol (PROVENTIL) (2.5 MG/3ML) 0.083% nebulizer solution Take 3 mLs by nebulization every 4-6 hours as needed for Wheezing 6/17/22   Matt Green MD   bictegravir-emtricitab-tenofovir alafenamide (BIKTARVY) -25 MG TABS per tablet Take 1 tablet by mouth nightly 6/17/22   Matt Green MD   budesonide-formoterol [facility]) 160-4.5 MCG/ACT AERO Inhale 2 puffs into the lungs 2 times daily 6/17/22   Matt Green MD   clobetasol (TEMOVATE) 0.05 % cream Apply topically 2 times daily as needed (RASH) 6/17/22   Matt Green MD   mineral oil-hydrophilic petrolatum (HYDROPHOR) ointment Apply topically as needed.  6/17/22   Matt Green MD   pantoprazole (PROTONIX) 40 MG tablet Take 1 tablet by mouth every morning (before breakfast) 6/17/22   Matt Green MD   nicotine (NICODERM CQ) 21 MG/24HR Place 1 patch onto the skin daily 6/17/22 7/17/22  Matt Green MD   OTEZLA 30 MG TABS  6/9/22   Historical Provider, MD   naproxen (NAPROSYN) 500 MG tablet Take 1 tablet by mouth 2 times daily as needed for Pain (take with food and full glass of water.)  Patient not taking: Reported on 3/29/2022 3/26/22 4/2/22  JUANA Connor - CNP       Future Appointments   Date Time Provider Margarita Huff   9/20/2022  1:00 PM MD [facility]      and   General Assessment    Do you have any symptoms that are causing concern?: No

## 2022-07-26 ENCOUNTER — CARE COORDINATION (OUTPATIENT)
Dept: CARE COORDINATION | Age: 42
End: 2022-07-26

## 2022-08-01 ENCOUNTER — CARE COORDINATION (OUTPATIENT)
Dept: CARE COORDINATION | Age: 42
End: 2022-08-01

## 2022-08-01 NOTE — CARE COORDINATION
ACM was contacted by Patrick Sparks from the [facility] office. She states that Destin Greene  on 22. Will end episode and notify PCP office.